# Patient Record
Sex: MALE | Race: BLACK OR AFRICAN AMERICAN | NOT HISPANIC OR LATINO | Employment: FULL TIME | URBAN - METROPOLITAN AREA
[De-identification: names, ages, dates, MRNs, and addresses within clinical notes are randomized per-mention and may not be internally consistent; named-entity substitution may affect disease eponyms.]

---

## 2021-08-04 ENCOUNTER — OFFICE VISIT (OUTPATIENT)
Dept: UROLOGY | Facility: CLINIC | Age: 46
End: 2021-08-04
Payer: COMMERCIAL

## 2021-08-04 DIAGNOSIS — N52.01 ERECTILE DYSFUNCTION DUE TO ARTERIAL INSUFFICIENCY: Primary | ICD-10-CM

## 2021-08-04 PROCEDURE — 99203 OFFICE O/P NEW LOW 30 MIN: CPT | Performed by: UROLOGY

## 2021-08-04 RX ORDER — MONTELUKAST SODIUM 10 MG/1
10 TABLET ORAL
COMMUNITY
Start: 2021-06-21

## 2021-08-04 RX ORDER — TAMSULOSIN HYDROCHLORIDE 0.4 MG/1
0.4 CAPSULE ORAL
COMMUNITY
Start: 2020-10-12

## 2021-08-04 RX ORDER — FLUTICASONE PROPIONATE 50 MCG
1 SPRAY, SUSPENSION (ML) NASAL
COMMUNITY
Start: 2020-04-10

## 2021-08-04 RX ORDER — ATORVASTATIN CALCIUM 20 MG/1
20 TABLET, FILM COATED ORAL
COMMUNITY
Start: 2021-07-28

## 2021-08-04 RX ORDER — FINASTERIDE 5 MG/1
5 TABLET, FILM COATED ORAL
COMMUNITY
Start: 2020-10-12

## 2021-08-04 NOTE — PROGRESS NOTES
I am seeing Walter Rehman in consultation from Dr. Hurtado for IPP surgery.    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  This was shown on penile duplex ultrasound.  Patient has failed ICI, and other ED therapies.  He has consulted with Dr. 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  He has no history of urinary problems, he has not had a hernia repair previously.    REVIEW OF SYSTEMS:  General: negative  Skin: negative  Eyes: negative  Ears/Nose/Throat: negative  Respiratory: negative  Cardiovascular: negative  Gastrointestinal: negative  Genitourinary: see HPI  Musculoskeletal: negative  Neurologic: negative  Psychiatric: negative  Hematologic/Lymphatic/Immunologic: negative  Endocrine: negative    PAST MEDICAL HX:  HLD   Asthma    PAST SURG HX:  Cystoscopy stent placement.  Vasectomy     FAMILY HX:  No family history on file.    SOCIAL HX:  Social History     Tobacco Use     Smoking status: Never Smoker     Smokeless tobacco: Never Used   Substance Use Topics     Alcohol use: Not on file     Drug use: Not on file       MEDICATIONS:  Current Outpatient Medications   Medication Sig     atorvastatin (LIPITOR) 20 MG tablet Take 20 mg by mouth     finasteride (PROSCAR) 5 MG tablet Take 5 mg by mouth     fluticasone (FLONASE) 50 MCG/ACT nasal spray Spray 1 spray in nostril     guaiFENesin-codeine (ROBITUSSIN AC) 100-10 MG/5ML SOLN Take 5 mLs by mouth every 6 hours as needed for cough     montelukast (SINGULAIR) 10 MG tablet Take 10 mg by mouth     tamsulosin (FLOMAX) 0.4 MG capsule Take 0.4 mg by mouth     No current facility-administered medications for this visit.       ALLERGIES:  Ciprofloxacin      GENERAL PHYSICAL EXAM:     There were no vitals taken for this visit.   Constitutional: No acute distress. Well nourished.   PSYCH: normal mood and affect.  NEURO: normal  gait, no focal deficits.   EYES: anicteric, EOMI, PERR.  CARDIOPULMONARY: breathing non-labored, pulse regular rate/rhythm, no peripheral edema.  GI: Abdomen soft, non-tender, nondistended   MUSCULOSKELETAL: normal limb proportions, no muscle wasting, no contractures.  SKIN: Normal virilized hair distribution, no lesions, warts or rashes over genitalia, abdomen extremities or face.  HEME/LYMPH: no ecchymosis, no lymphadenopathy in groin, no lymphedema.     EXAM:  Phallus circumcised, meatus adequate, no plaques palpated.   Left testis descended , size is 15, consistency is normal. No intra-testicular masses.   Right testis descended , size is 15, consistency is normal . No intra-testicular masses.   Epididymes present, non-tender, not-enlarged.   Cord structures not remarkable.     Prostate exam: deferred     Imaging/labs:  Outside facility labs July 2020  Total testosterone 495  Prolactin 8.9  LH 8.6  FSH 2.8    ASSESSMENT:     ED secondary to arterial disease, congenital versus acquired.  He has failed other 1st and 2nd line options, and is requesting a penile implant.    PLAN:    I counseled the patient on the risks of penile implant surgery. These include, but are not limited to infection, scarring, penile shortening, damage to urethra and bladder, pain and erosion. I explained to him the risk of infection and the need for explantation in those cases; that other treatments for ED cannot be used after placing an implant, and that implants have a mechanical failure rate.  Discussed possible ectopic reservoir, possible glans necrosis/ tissue loss.  I answered all of his questions to the best of my ability and to the patient's satisfaction.  I showed him the model device, Coloplast.      Schedule IPP surgery, next available.       Jonah Slater MD     Urological Surgeon    Additional Coding Information:    Problems:  3 -- one stable chronic illness    Data Reviewed  Review of the result(s) of each unique test -  Labs as listed      Level of risk:  3 -- low risk (e.g., OTC medication or observation, minor surgery without risks)    Time spent:  30 minutes spent on the date of the encounter doing chart review, history and exam, documentation and further activities per the note

## 2021-08-27 ENCOUNTER — TELEPHONE (OUTPATIENT)
Dept: UROLOGY | Facility: CLINIC | Age: 46
End: 2021-08-27

## 2021-08-27 PROBLEM — N52.01 ERECTILE DYSFUNCTION DUE TO ARTERIAL INSUFFICIENCY: Status: ACTIVE | Noted: 2021-08-27

## 2021-08-30 NOTE — TELEPHONE ENCOUNTER
Patient is scheduled for surgery with Dr. Slater    Spoke with: Patient     Date of Surgery: Tuesday 10/05/21    Location: [facility] OR     Informed patient they will need an adult  Yes    Pre op with Provider na    H&P: Scheduled with Patient will call and schedule pre-op with his PCP.    Pre-procedure COVID-19 Test: patient will have done at [facility].     Additional imaging/appointments: 2 week post op in person visit with Dr. Slater Friday 10/22/21     Surgery packet: mailed to patient 08/30/21, verified address on file is current and correct.      Additional comments: na

## 2021-09-05 DIAGNOSIS — Z11.59 ENCOUNTER FOR SCREENING FOR OTHER VIRAL DISEASES: ICD-10-CM

## 2021-09-28 ENCOUNTER — PATIENT OUTREACH (OUTPATIENT)
Dept: UROLOGY | Facility: CLINIC | Age: 46
End: 2021-09-28

## 2021-09-28 ENCOUNTER — TELEPHONE (OUTPATIENT)
Dept: UROLOGY | Facility: CLINIC | Age: 46
End: 2021-09-28

## 2021-09-28 DIAGNOSIS — N39.0 URINARY TRACT INFECTION: Primary | ICD-10-CM

## 2021-09-28 NOTE — TELEPHONE ENCOUNTER
[facility] Call Center    Phone Message    May a detailed message be left on voicemail: yes     Reason for Call: Other: Patient returned call.  He has his pre-op, covid test and lab all scheudled for this Friday.       Action Taken: Message routed to:  [facility] ([facility] Uro    Travel Screening: Not Applicable

## 2021-09-28 NOTE — TELEPHONE ENCOUNTER
Left message to see if he has pre-op physical scheduled for his procedure with Dr Slater 10/5/21. He also needs to have urine culture and Covid testing     Ina Saleh, RN   Care Coordinator Urology

## 2021-09-30 ENCOUNTER — PATIENT OUTREACH (OUTPATIENT)
Dept: UROLOGY | Facility: CLINIC | Age: 46
End: 2021-09-30

## 2021-09-30 RX ORDER — CARVEDILOL 3.12 MG/1
3.12 TABLET ORAL 2 TIMES DAILY WITH MEALS
COMMUNITY

## 2021-09-30 NOTE — TELEPHONE ENCOUNTER
Patient will have his pre-op done tomorrow with Dr Dung Vizcaino at the [facility]  He will have urine done at the Covid test visit

## 2021-10-01 ENCOUNTER — LAB (OUTPATIENT)
Dept: LAB | Facility: CLINIC | Age: 46
End: 2021-10-01
Attending: UROLOGY

## 2021-10-01 ENCOUNTER — LAB (OUTPATIENT)
Dept: LAB | Facility: CLINIC | Age: 46
End: 2021-10-01
Attending: UROLOGY
Payer: COMMERCIAL

## 2021-10-01 DIAGNOSIS — Z11.59 ENCOUNTER FOR SCREENING FOR OTHER VIRAL DISEASES: ICD-10-CM

## 2021-10-01 DIAGNOSIS — N39.0 URINARY TRACT INFECTION: ICD-10-CM

## 2021-10-01 PROCEDURE — U0005 INFEC AGEN DETEC AMPLI PROBE: HCPCS | Mod: 90 | Performed by: PATHOLOGY

## 2021-10-01 PROCEDURE — U0003 INFECTIOUS AGENT DETECTION BY NUCLEIC ACID (DNA OR RNA); SEVERE ACUTE RESPIRATORY SYNDROME CORONAVIRUS 2 (SARS-COV-2) (CORONAVIRUS DISEASE [COVID-19]), AMPLIFIED PROBE TECHNIQUE, MAKING USE OF HIGH THROUGHPUT TECHNOLOGIES AS DESCRIBED BY CMS-2020-01-R: HCPCS | Mod: 90 | Performed by: PATHOLOGY

## 2021-10-01 PROCEDURE — 87086 URINE CULTURE/COLONY COUNT: CPT | Mod: 90 | Performed by: PATHOLOGY

## 2021-10-02 LAB
BACTERIA UR CULT: NO GROWTH
SARS-COV-2 RNA RESP QL NAA+PROBE: NEGATIVE

## 2021-10-04 ENCOUNTER — ANESTHESIA EVENT (OUTPATIENT)
Dept: SURGERY | Facility: CLINIC | Age: 46
End: 2021-10-04
Payer: COMMERCIAL

## 2021-10-05 ENCOUNTER — ANESTHESIA (OUTPATIENT)
Dept: SURGERY | Facility: CLINIC | Age: 46
End: 2021-10-05
Payer: COMMERCIAL

## 2021-10-05 ENCOUNTER — CONFIDENTIAL (OUTPATIENT)
Dept: UROLOGY | Facility: CLINIC | Age: 46
End: 2021-10-05

## 2021-10-05 ENCOUNTER — HOSPITAL ENCOUNTER (OUTPATIENT)
Facility: CLINIC | Age: 46
LOS: 1 days | Discharge: HOME | End: 2021-10-07
Attending: UROLOGY | Admitting: UROLOGY
Payer: COMMERCIAL

## 2021-10-05 ENCOUNTER — PRE VISIT (OUTPATIENT)
Dept: UROLOGY | Facility: CLINIC | Age: 46
End: 2021-10-05

## 2021-10-05 DIAGNOSIS — N52.01 ERECTILE DYSFUNCTION DUE TO ARTERIAL INSUFFICIENCY: ICD-10-CM

## 2021-10-05 LAB — GLUCOSE BLDC GLUCOMTR-MCNC: 97 MG/DL (ref 70–99)

## 2021-10-05 PROCEDURE — 250N000013 HC RX MED GY IP 250 OP 250 PS 637: Performed by: STUDENT IN AN ORGANIZED HEALTH CARE EDUCATION/TRAINING PROGRAM

## 2021-10-05 PROCEDURE — 258N000003 HC RX IP 258 OP 636: Performed by: UROLOGY

## 2021-10-05 PROCEDURE — 370N000017 HC ANESTHESIA TECHNICAL FEE, PER MIN: Performed by: UROLOGY

## 2021-10-05 PROCEDURE — 258N000003 HC RX IP 258 OP 636

## 2021-10-05 PROCEDURE — 250N000011 HC RX IP 250 OP 636: Performed by: NURSE ANESTHETIST, CERTIFIED REGISTERED

## 2021-10-05 PROCEDURE — 250N000011 HC RX IP 250 OP 636: Performed by: STUDENT IN AN ORGANIZED HEALTH CARE EDUCATION/TRAINING PROGRAM

## 2021-10-05 PROCEDURE — 272N000001 HC OR GENERAL SUPPLY STERILE: Performed by: UROLOGY

## 2021-10-05 PROCEDURE — C1713 ANCHOR/SCREW BN/BN,TIS/BN: HCPCS | Performed by: UROLOGY

## 2021-10-05 PROCEDURE — 278N000051 HC OR IMPLANT GENERAL: Performed by: UROLOGY

## 2021-10-05 PROCEDURE — 250N000013 HC RX MED GY IP 250 OP 250 PS 637: Performed by: ANESTHESIOLOGY

## 2021-10-05 PROCEDURE — 250N000011 HC RX IP 250 OP 636: Performed by: UROLOGY

## 2021-10-05 PROCEDURE — 360N000076 HC SURGERY LEVEL 3, PER MIN: Performed by: UROLOGY

## 2021-10-05 PROCEDURE — 710N000010 HC RECOVERY PHASE 1, LEVEL 2, PER MIN: Performed by: UROLOGY

## 2021-10-05 PROCEDURE — 250N000025 HC SEVOFLURANE, PER MIN: Performed by: UROLOGY

## 2021-10-05 PROCEDURE — 250N000009 HC RX 250: Performed by: NURSE ANESTHETIST, CERTIFIED REGISTERED

## 2021-10-05 PROCEDURE — 999N000141 HC STATISTIC PRE-PROCEDURE NURSING ASSESSMENT: Performed by: UROLOGY

## 2021-10-05 PROCEDURE — 258N000003 HC RX IP 258 OP 636: Performed by: STUDENT IN AN ORGANIZED HEALTH CARE EDUCATION/TRAINING PROGRAM

## 2021-10-05 PROCEDURE — 250N000009 HC RX 250: Performed by: UROLOGY

## 2021-10-05 PROCEDURE — 54405 INSERT MULTI-COMP PENIS PROS: CPT | Mod: GC | Performed by: STUDENT IN AN ORGANIZED HEALTH CARE EDUCATION/TRAINING PROGRAM

## 2021-10-05 PROCEDURE — 250N000009 HC RX 250: Performed by: STUDENT IN AN ORGANIZED HEALTH CARE EDUCATION/TRAINING PROGRAM

## 2021-10-05 PROCEDURE — C1813 PROSTHESIS, PENILE, INFLATAB: HCPCS | Performed by: UROLOGY

## 2021-10-05 DEVICE — IMPLANTABLE DEVICE: Type: IMPLANTABLE DEVICE | Site: ABDOMEN | Status: FUNCTIONAL

## 2021-10-05 DEVICE — SCROTAL ZERO DEGREE ANGLE CYLINDER SET WITH PUMP
Type: IMPLANTABLE DEVICE | Site: PENIS | Status: FUNCTIONAL
Brand: TITAN

## 2021-10-05 DEVICE — IMP PROSTHESIS PENILE TITAN STD ASSEMBLY KIT 91-9480SC: Type: IMPLANTABLE DEVICE | Site: PENIS | Status: FUNCTIONAL

## 2021-10-05 RX ORDER — FENTANYL CITRATE 50 UG/ML
0.5 INJECTION, SOLUTION INTRAMUSCULAR; INTRAVENOUS EVERY 10 MIN PRN
Status: CANCELLED | OUTPATIENT
Start: 2021-10-05

## 2021-10-05 RX ORDER — DOCUSATE SODIUM 100 MG/1
100 CAPSULE, LIQUID FILLED ORAL 2 TIMES DAILY
Status: DISCONTINUED | OUTPATIENT
Start: 2021-10-05 | End: 2021-10-07 | Stop reason: HOSPADM

## 2021-10-05 RX ORDER — ACETAMINOPHEN 325 MG/1
975 TABLET ORAL ONCE
Status: COMPLETED | OUTPATIENT
Start: 2021-10-05 | End: 2021-10-05

## 2021-10-05 RX ORDER — LIDOCAINE 40 MG/G
CREAM TOPICAL
Status: DISCONTINUED | OUTPATIENT
Start: 2021-10-05 | End: 2021-10-05 | Stop reason: HOSPADM

## 2021-10-05 RX ORDER — PROPOFOL 10 MG/ML
INJECTION, EMULSION INTRAVENOUS CONTINUOUS PRN
Status: DISCONTINUED | OUTPATIENT
Start: 2021-10-05 | End: 2021-10-05

## 2021-10-05 RX ORDER — CEFAZOLIN SODIUM 2 G/100ML
2 INJECTION, SOLUTION INTRAVENOUS SEE ADMIN INSTRUCTIONS
Status: DISCONTINUED | OUTPATIENT
Start: 2021-10-05 | End: 2021-10-05 | Stop reason: HOSPADM

## 2021-10-05 RX ORDER — METOPROLOL TARTRATE 1 MG/ML
1-2 INJECTION, SOLUTION INTRAVENOUS EVERY 5 MIN PRN
Status: DISCONTINUED | OUTPATIENT
Start: 2021-10-05 | End: 2021-10-05 | Stop reason: HOSPADM

## 2021-10-05 RX ORDER — EPHEDRINE SULFATE 50 MG/ML
INJECTION, SOLUTION INTRAMUSCULAR; INTRAVENOUS; SUBCUTANEOUS PRN
Status: DISCONTINUED | OUTPATIENT
Start: 2021-10-05 | End: 2021-10-05

## 2021-10-05 RX ORDER — ONDANSETRON 2 MG/ML
INJECTION INTRAMUSCULAR; INTRAVENOUS PRN
Status: DISCONTINUED | OUTPATIENT
Start: 2021-10-05 | End: 2021-10-05

## 2021-10-05 RX ORDER — PROPOFOL 10 MG/ML
INJECTION, EMULSION INTRAVENOUS PRN
Status: DISCONTINUED | OUTPATIENT
Start: 2021-10-05 | End: 2021-10-05

## 2021-10-05 RX ORDER — SODIUM CHLORIDE, SODIUM LACTATE, POTASSIUM CHLORIDE, CALCIUM CHLORIDE 600; 310; 30; 20 MG/100ML; MG/100ML; MG/100ML; MG/100ML
INJECTION, SOLUTION INTRAVENOUS CONTINUOUS
Status: DISCONTINUED | OUTPATIENT
Start: 2021-10-05 | End: 2021-10-05 | Stop reason: HOSPADM

## 2021-10-05 RX ORDER — ONDANSETRON 4 MG/1
4 TABLET, ORALLY DISINTEGRATING ORAL EVERY 30 MIN PRN
Status: DISCONTINUED | OUTPATIENT
Start: 2021-10-05 | End: 2021-10-05 | Stop reason: HOSPADM

## 2021-10-05 RX ORDER — BACITRACIN ZINC 500 [USP'U]/G
OINTMENT TOPICAL PRN
Status: DISCONTINUED | OUTPATIENT
Start: 2021-10-05 | End: 2021-10-05 | Stop reason: HOSPADM

## 2021-10-05 RX ORDER — ACETAMINOPHEN 325 MG/1
650 TABLET ORAL EVERY 6 HOURS
Status: DISCONTINUED | OUTPATIENT
Start: 2021-10-05 | End: 2021-10-07 | Stop reason: HOSPADM

## 2021-10-05 RX ORDER — BUPIVACAINE HYDROCHLORIDE 5 MG/ML
INJECTION, SOLUTION PERINEURAL PRN
Status: DISCONTINUED | OUTPATIENT
Start: 2021-10-05 | End: 2021-10-05 | Stop reason: HOSPADM

## 2021-10-05 RX ORDER — FENTANYL CITRATE 50 UG/ML
25 INJECTION, SOLUTION INTRAMUSCULAR; INTRAVENOUS EVERY 5 MIN PRN
Status: DISCONTINUED | OUTPATIENT
Start: 2021-10-05 | End: 2021-10-05 | Stop reason: HOSPADM

## 2021-10-05 RX ORDER — ONDANSETRON 2 MG/ML
4 INJECTION INTRAMUSCULAR; INTRAVENOUS EVERY 30 MIN PRN
Status: DISCONTINUED | OUTPATIENT
Start: 2021-10-05 | End: 2021-10-05 | Stop reason: HOSPADM

## 2021-10-05 RX ORDER — NALOXONE HYDROCHLORIDE 0.4 MG/ML
0.4 INJECTION, SOLUTION INTRAMUSCULAR; INTRAVENOUS; SUBCUTANEOUS
Status: DISCONTINUED | OUTPATIENT
Start: 2021-10-05 | End: 2021-10-07 | Stop reason: HOSPADM

## 2021-10-05 RX ORDER — SODIUM CHLORIDE 9 MG/ML
INJECTION, SOLUTION INTRAVENOUS CONTINUOUS
Status: DISCONTINUED | OUTPATIENT
Start: 2021-10-05 | End: 2021-10-06

## 2021-10-05 RX ORDER — CEFAZOLIN SODIUM 1 G/3ML
1 INJECTION, POWDER, FOR SOLUTION INTRAMUSCULAR; INTRAVENOUS EVERY 8 HOURS
Status: DISCONTINUED | OUTPATIENT
Start: 2021-10-05 | End: 2021-10-07 | Stop reason: HOSPADM

## 2021-10-05 RX ORDER — FINASTERIDE 5 MG/1
5 TABLET, FILM COATED ORAL DAILY
Status: DISCONTINUED | OUTPATIENT
Start: 2021-10-05 | End: 2021-10-07 | Stop reason: HOSPADM

## 2021-10-05 RX ORDER — OXYCODONE HYDROCHLORIDE 10 MG/1
10 TABLET ORAL EVERY 4 HOURS PRN
Status: DISCONTINUED | OUTPATIENT
Start: 2021-10-05 | End: 2021-10-07 | Stop reason: HOSPADM

## 2021-10-05 RX ORDER — NALOXONE HYDROCHLORIDE 0.4 MG/ML
0.2 INJECTION, SOLUTION INTRAMUSCULAR; INTRAVENOUS; SUBCUTANEOUS
Status: DISCONTINUED | OUTPATIENT
Start: 2021-10-05 | End: 2021-10-07 | Stop reason: HOSPADM

## 2021-10-05 RX ORDER — CARVEDILOL 3.12 MG/1
3.12 TABLET ORAL 2 TIMES DAILY WITH MEALS
Status: DISCONTINUED | OUTPATIENT
Start: 2021-10-05 | End: 2021-10-07 | Stop reason: HOSPADM

## 2021-10-05 RX ORDER — OXYCODONE HYDROCHLORIDE 5 MG/1
5 TABLET ORAL EVERY 4 HOURS PRN
Status: DISCONTINUED | OUTPATIENT
Start: 2021-10-05 | End: 2021-10-05 | Stop reason: HOSPADM

## 2021-10-05 RX ORDER — OXYCODONE HYDROCHLORIDE 5 MG/1
5 TABLET ORAL EVERY 6 HOURS PRN
Qty: 20 TABLET | Refills: 0 | Status: SHIPPED | OUTPATIENT
Start: 2021-10-05 | End: 2021-10-10

## 2021-10-05 RX ORDER — DEXAMETHASONE SODIUM PHOSPHATE 4 MG/ML
8 INJECTION, SOLUTION INTRA-ARTICULAR; INTRALESIONAL; INTRAMUSCULAR; INTRAVENOUS; SOFT TISSUE
Status: CANCELLED | OUTPATIENT
Start: 2021-10-05

## 2021-10-05 RX ORDER — TAMSULOSIN HYDROCHLORIDE 0.4 MG/1
0.4 CAPSULE ORAL DAILY
Status: DISCONTINUED | OUTPATIENT
Start: 2021-10-05 | End: 2021-10-07 | Stop reason: HOSPADM

## 2021-10-05 RX ORDER — HYDROMORPHONE HCL IN WATER/PF 6 MG/30 ML
0.2 PATIENT CONTROLLED ANALGESIA SYRINGE INTRAVENOUS EVERY 5 MIN PRN
Status: DISCONTINUED | OUTPATIENT
Start: 2021-10-05 | End: 2021-10-05 | Stop reason: HOSPADM

## 2021-10-05 RX ORDER — CEFAZOLIN SODIUM 2 G/100ML
2 INJECTION, SOLUTION INTRAVENOUS
Status: COMPLETED | OUTPATIENT
Start: 2021-10-05 | End: 2021-10-05

## 2021-10-05 RX ORDER — HYDROMORPHONE HCL IN WATER/PF 6 MG/30 ML
0.2 PATIENT CONTROLLED ANALGESIA SYRINGE INTRAVENOUS
Status: DISCONTINUED | OUTPATIENT
Start: 2021-10-05 | End: 2021-10-07 | Stop reason: HOSPADM

## 2021-10-05 RX ORDER — SODIUM CHLORIDE, SODIUM LACTATE, POTASSIUM CHLORIDE, CALCIUM CHLORIDE 600; 310; 30; 20 MG/100ML; MG/100ML; MG/100ML; MG/100ML
INJECTION, SOLUTION INTRAVENOUS CONTINUOUS PRN
Status: DISCONTINUED | OUTPATIENT
Start: 2021-10-05 | End: 2021-10-05

## 2021-10-05 RX ORDER — LIDOCAINE 40 MG/G
CREAM TOPICAL
Status: DISCONTINUED | OUTPATIENT
Start: 2021-10-05 | End: 2021-10-07 | Stop reason: HOSPADM

## 2021-10-05 RX ORDER — ALBUTEROL SULFATE 0.83 MG/ML
2.5 SOLUTION RESPIRATORY (INHALATION)
Status: DISCONTINUED | OUTPATIENT
Start: 2021-10-05 | End: 2021-10-05 | Stop reason: HOSPADM

## 2021-10-05 RX ORDER — OXYCODONE HYDROCHLORIDE 5 MG/1
5 TABLET ORAL EVERY 4 HOURS PRN
Status: DISCONTINUED | OUTPATIENT
Start: 2021-10-05 | End: 2021-10-07 | Stop reason: HOSPADM

## 2021-10-05 RX ORDER — LIDOCAINE HYDROCHLORIDE 20 MG/ML
INJECTION, SOLUTION INFILTRATION; PERINEURAL PRN
Status: DISCONTINUED | OUTPATIENT
Start: 2021-10-05 | End: 2021-10-05

## 2021-10-05 RX ORDER — ATORVASTATIN CALCIUM 20 MG/1
20 TABLET, FILM COATED ORAL DAILY
Status: DISCONTINUED | OUTPATIENT
Start: 2021-10-05 | End: 2021-10-07 | Stop reason: HOSPADM

## 2021-10-05 RX ORDER — FLUTICASONE PROPIONATE 50 MCG
1 SPRAY, SUSPENSION (ML) NASAL DAILY
Status: DISCONTINUED | OUTPATIENT
Start: 2021-10-05 | End: 2021-10-07 | Stop reason: HOSPADM

## 2021-10-05 RX ORDER — SODIUM CHLORIDE 9 MG/ML
INJECTION, SOLUTION INTRAVENOUS
Status: COMPLETED
Start: 2021-10-05 | End: 2021-10-05

## 2021-10-05 RX ORDER — ONDANSETRON 4 MG/1
4 TABLET, ORALLY DISINTEGRATING ORAL EVERY 6 HOURS PRN
Status: DISCONTINUED | OUTPATIENT
Start: 2021-10-05 | End: 2021-10-07 | Stop reason: HOSPADM

## 2021-10-05 RX ORDER — MONTELUKAST SODIUM 10 MG/1
10 TABLET ORAL AT BEDTIME
Status: DISCONTINUED | OUTPATIENT
Start: 2021-10-05 | End: 2021-10-07 | Stop reason: HOSPADM

## 2021-10-05 RX ORDER — DEXAMETHASONE SODIUM PHOSPHATE 4 MG/ML
INJECTION, SOLUTION INTRA-ARTICULAR; INTRALESIONAL; INTRAMUSCULAR; INTRAVENOUS; SOFT TISSUE PRN
Status: DISCONTINUED | OUTPATIENT
Start: 2021-10-05 | End: 2021-10-05

## 2021-10-05 RX ORDER — OXYCODONE HCL 5 MG/5 ML
0.1 SOLUTION, ORAL ORAL EVERY 4 HOURS PRN
Status: CANCELLED | OUTPATIENT
Start: 2021-10-05

## 2021-10-05 RX ORDER — ONDANSETRON 2 MG/ML
4 INJECTION INTRAMUSCULAR; INTRAVENOUS EVERY 6 HOURS PRN
Status: DISCONTINUED | OUTPATIENT
Start: 2021-10-05 | End: 2021-10-07 | Stop reason: HOSPADM

## 2021-10-05 RX ORDER — HYDROMORPHONE HCL IN WATER/PF 6 MG/30 ML
0.4 PATIENT CONTROLLED ANALGESIA SYRINGE INTRAVENOUS
Status: DISCONTINUED | OUTPATIENT
Start: 2021-10-05 | End: 2021-10-07 | Stop reason: HOSPADM

## 2021-10-05 RX ORDER — FENTANYL CITRATE 50 UG/ML
INJECTION, SOLUTION INTRAMUSCULAR; INTRAVENOUS PRN
Status: DISCONTINUED | OUTPATIENT
Start: 2021-10-05 | End: 2021-10-05

## 2021-10-05 RX ADMIN — GENTAMICIN SULFATE 240 MG: 40 INJECTION, SOLUTION INTRAMUSCULAR; INTRAVENOUS at 14:18

## 2021-10-05 RX ADMIN — Medication 5 MG: at 15:21

## 2021-10-05 RX ADMIN — Medication 2 G: at 14:18

## 2021-10-05 RX ADMIN — DOCUSATE SODIUM 100 MG: 100 CAPSULE, LIQUID FILLED ORAL at 20:00

## 2021-10-05 RX ADMIN — FENTANYL CITRATE 25 MCG: 50 INJECTION, SOLUTION INTRAMUSCULAR; INTRAVENOUS at 14:24

## 2021-10-05 RX ADMIN — ACETAMINOPHEN 975 MG: 325 TABLET, FILM COATED ORAL at 13:41

## 2021-10-05 RX ADMIN — MONTELUKAST 10 MG: 10 TABLET, FILM COATED ORAL at 22:08

## 2021-10-05 RX ADMIN — PROPOFOL 25 MCG/KG/MIN: 10 INJECTION, EMULSION INTRAVENOUS at 14:33

## 2021-10-05 RX ADMIN — FENTANYL CITRATE 25 MCG: 50 INJECTION, SOLUTION INTRAMUSCULAR; INTRAVENOUS at 16:39

## 2021-10-05 RX ADMIN — PROPOFOL 250 MG: 10 INJECTION, EMULSION INTRAVENOUS at 14:24

## 2021-10-05 RX ADMIN — FENTANYL CITRATE 25 MCG: 50 INJECTION, SOLUTION INTRAMUSCULAR; INTRAVENOUS at 16:33

## 2021-10-05 RX ADMIN — CARVEDILOL 3.12 MG: 3.12 TABLET, FILM COATED ORAL at 19:06

## 2021-10-05 RX ADMIN — FENTANYL CITRATE 50 MCG: 50 INJECTION, SOLUTION INTRAMUSCULAR; INTRAVENOUS at 14:54

## 2021-10-05 RX ADMIN — FENTANYL CITRATE 25 MCG: 50 INJECTION, SOLUTION INTRAMUSCULAR; INTRAVENOUS at 16:58

## 2021-10-05 RX ADMIN — PROPOFOL 50 MG: 10 INJECTION, EMULSION INTRAVENOUS at 15:14

## 2021-10-05 RX ADMIN — SODIUM CHLORIDE: 9 INJECTION, SOLUTION INTRAVENOUS at 19:23

## 2021-10-05 RX ADMIN — ACETAMINOPHEN 975 MG: 325 TABLET, FILM COATED ORAL at 14:11

## 2021-10-05 RX ADMIN — ONDANSETRON 4 MG: 2 INJECTION INTRAMUSCULAR; INTRAVENOUS at 15:45

## 2021-10-05 RX ADMIN — DEXAMETHASONE SODIUM PHOSPHATE 6 MG: 4 INJECTION, SOLUTION INTRAMUSCULAR; INTRAVENOUS at 14:18

## 2021-10-05 RX ADMIN — Medication 5 MG: at 15:20

## 2021-10-05 RX ADMIN — LIDOCAINE HYDROCHLORIDE 90 MG: 20 INJECTION, SOLUTION INFILTRATION; PERINEURAL at 14:24

## 2021-10-05 RX ADMIN — SODIUM CHLORIDE, POTASSIUM CHLORIDE, SODIUM LACTATE AND CALCIUM CHLORIDE: 600; 310; 30; 20 INJECTION, SOLUTION INTRAVENOUS at 14:18

## 2021-10-05 RX ADMIN — Medication 7.5 MG: at 15:43

## 2021-10-05 RX ADMIN — HYDROMORPHONE HYDROCHLORIDE 0.2 MG: 0.2 INJECTION, SOLUTION INTRAMUSCULAR; INTRAVENOUS; SUBCUTANEOUS at 17:34

## 2021-10-05 RX ADMIN — HYDROMORPHONE HYDROCHLORIDE 0.2 MG: 1 INJECTION, SOLUTION INTRAMUSCULAR; INTRAVENOUS; SUBCUTANEOUS at 15:29

## 2021-10-05 RX ADMIN — HYDROMORPHONE HYDROCHLORIDE 0.3 MG: 1 INJECTION, SOLUTION INTRAMUSCULAR; INTRAVENOUS; SUBCUTANEOUS at 15:13

## 2021-10-05 RX ADMIN — FENTANYL CITRATE 25 MCG: 50 INJECTION, SOLUTION INTRAMUSCULAR; INTRAVENOUS at 16:51

## 2021-10-05 RX ADMIN — FENTANYL CITRATE 25 MCG: 50 INJECTION, SOLUTION INTRAMUSCULAR; INTRAVENOUS at 17:13

## 2021-10-05 RX ADMIN — OXYCODONE HYDROCHLORIDE 10 MG: 10 TABLET ORAL at 22:21

## 2021-10-05 RX ADMIN — FINASTERIDE 5 MG: 5 TABLET, FILM COATED ORAL at 19:06

## 2021-10-05 RX ADMIN — FENTANYL CITRATE 25 MCG: 50 INJECTION, SOLUTION INTRAMUSCULAR; INTRAVENOUS at 17:20

## 2021-10-05 RX ADMIN — ATORVASTATIN CALCIUM 20 MG: 20 TABLET, FILM COATED ORAL at 19:11

## 2021-10-05 RX ADMIN — HYDROMORPHONE HYDROCHLORIDE 0.3 MG: 1 INJECTION, SOLUTION INTRAMUSCULAR; INTRAVENOUS; SUBCUTANEOUS at 15:07

## 2021-10-05 RX ADMIN — ACETAMINOPHEN 650 MG: 325 TABLET, FILM COATED ORAL at 20:00

## 2021-10-05 RX ADMIN — OXYCODONE HYDROCHLORIDE 5 MG: 5 TABLET ORAL at 17:14

## 2021-10-05 RX ADMIN — HYDROMORPHONE HYDROCHLORIDE 0.2 MG: 1 INJECTION, SOLUTION INTRAMUSCULAR; INTRAVENOUS; SUBCUTANEOUS at 15:12

## 2021-10-05 RX ADMIN — CEFAZOLIN 1 G: 1 INJECTION, POWDER, FOR SOLUTION INTRAMUSCULAR; INTRAVENOUS at 22:09

## 2021-10-05 RX ADMIN — FENTANYL CITRATE 25 MCG: 50 INJECTION, SOLUTION INTRAMUSCULAR; INTRAVENOUS at 17:07

## 2021-10-05 RX ADMIN — HYDROMORPHONE HYDROCHLORIDE 0.4 MG: 0.2 INJECTION, SOLUTION INTRAMUSCULAR; INTRAVENOUS; SUBCUTANEOUS at 20:01

## 2021-10-05 RX ADMIN — MIDAZOLAM 2 MG: 1 INJECTION INTRAMUSCULAR; INTRAVENOUS at 14:24

## 2021-10-05 RX ADMIN — HYDROMORPHONE HYDROCHLORIDE 0.2 MG: 1 INJECTION, SOLUTION INTRAMUSCULAR; INTRAVENOUS; SUBCUTANEOUS at 15:37

## 2021-10-05 RX ADMIN — FENTANYL CITRATE 25 MCG: 50 INJECTION, SOLUTION INTRAMUSCULAR; INTRAVENOUS at 17:28

## 2021-10-05 RX ADMIN — TAMSULOSIN HYDROCHLORIDE 0.4 MG: 0.4 CAPSULE ORAL at 19:12

## 2021-10-05 RX ADMIN — FENTANYL CITRATE 25 MCG: 50 INJECTION, SOLUTION INTRAMUSCULAR; INTRAVENOUS at 14:50

## 2021-10-05 ASSESSMENT — MIFFLIN-ST. JEOR: SCORE: 1684

## 2021-10-05 ASSESSMENT — LIFESTYLE VARIABLES: TOBACCO_USE: 1

## 2021-10-05 NOTE — ANESTHESIA PREPROCEDURE EVALUATION
Anesthesia Pre-Procedure Evaluation    Patient: Walter Rehman   MRN: [id number] : 1975        Preoperative Diagnosis: Erectile dysfunction due to arterial insufficiency [N52.01]    Procedure : Procedure(s):  INSERTION, PENILE PROSTHESIS, INFLATABLE          Past Medical History:   Diagnosis Date     Depressive disorder      Erectile dysfunction      Uncomplicated asthma       History reviewed. No pertinent surgical history.   Allergies   Allergen Reactions     Ciprofloxacin Other (See Comments)     Severe vertigo; question after the fact if this is accurate as vertigo is a long standing problem that comes and goes and likely coincidential  Severe vertigo; question after the fact if this is accurate as vertigo is a long standing problem that comes and goes and likely coincidential        Social History     Tobacco Use     Smoking status: Former Smoker     Smokeless tobacco: Never Used   Substance Use Topics     Alcohol use: Yes     Alcohol/week: 0.0 standard drinks      Wt Readings from Last 1 Encounters:   16 82.1 kg (181 lb)        Anesthesia Evaluation   Pt has had prior anesthetic. Type: General.        ROS/MED HX  ENT/Pulmonary: Comment: Smoked cigars 10 years ago - neg pulmonary ROS   (+) tobacco use, Past use, Intermittent, asthma     Neurologic:  - neg neurologic ROS     Cardiovascular: Comment: CT scan of chest.  No coronary calcifications noted. Mildly reduced ejection fraction (mild cardiomyopathy).    (+) Dyslipidemia hypertension-----Previous cardiac testing   Echo: Date: 2021 Results:  LVEF 45-50%  Stress Test: Date: Results:    ECG Reviewed: Date: Results:    Cath: Date: Results:      METS/Exercise Tolerance:     Hematologic:  - neg hematologic  ROS     Musculoskeletal:  - neg musculoskeletal ROS     GI/Hepatic:  - neg GI/hepatic ROS     Renal/Genitourinary:  - neg Renal ROS     Endo:  - neg endo ROS     Psychiatric/Substance Use:       Infectious Disease:  - neg infectious  disease ROS     Malignancy:  - neg malignancy ROS     Other:            Physical Exam    Airway  airway exam normal           Respiratory Devices and Support         Dental  no notable dental history         Cardiovascular   cardiovascular exam normal          Pulmonary   pulmonary exam normal                OUTSIDE LABS:  CBC:   Lab Results   Component Value Date    WBC 5.6 04/18/2016    HGB 16.2 04/18/2016    HCT 46.6 04/18/2016     04/18/2016     BMP: No results found for: NA, POTASSIUM, CHLORIDE, CO2, BUN, CR, GLC  COAGS: No results found for: PTT, INR, FIBR  POC: No results found for: BGM, HCG, HCGS  HEPATIC: No results found for: ALBUMIN, PROTTOTAL, ALT, AST, GGT, ALKPHOS, BILITOTAL, BILIDIRECT, DAYANARA  OTHER: No results found for: PH, LACT, A1C, PARTHA, PHOS, MAG, LIPASE, AMYLASE, TSH, T4, T3, CRP, SED    Anesthesia Plan    ASA Status:  2   NPO Status:  NPO Appropriate    Anesthesia Type: General.     - Airway: LMA   Induction: Intravenous, Propofol.   Maintenance: Balanced.        Consents    Anesthesia Plan(s) and associated risks, benefits, and realistic alternatives discussed. Questions answered and patient/representative(s) expressed understanding.     - Discussed with:  Patient      - Extended Intubation/Ventilatory Support Discussed: No.      - Patient is DNR/DNI Status: No    Use of blood products discussed: No .     Postoperative Care    Pain management: IV analgesics, Oral pain medications.   PONV prophylaxis: Ondansetron (or other 5HT-3), Background Propofol Infusion, Dexamethasone or Solumedrol     Comments:                Shant Rosa MD

## 2021-10-05 NOTE — ANESTHESIA POSTPROCEDURE EVALUATION
Patient: Walter Rehman    Procedure: Procedure(s):  INSERTION, PENILE PROSTHESIS, INFLATABLE       Diagnosis:Erectile dysfunction due to arterial insufficiency [N52.01]  Diagnosis Additional Information: No value filed.    Anesthesia Type:  General    Note:  Disposition: Inpatient; Admission   Postop Pain Control: Uneventful            Sign Out: Well controlled pain   PONV: No   Neuro/Psych: Uneventful            Sign Out: Acceptable/Baseline neuro status   Airway/Respiratory: Uneventful            Sign Out: Acceptable/Baseline resp. status   CV/Hemodynamics: Uneventful            Sign Out: Acceptable CV status; No obvious hypovolemia; No obvious fluid overload   Other NRE: NONE   DID A NON-ROUTINE EVENT OCCUR? No    Event details/Postop Comments:  Tolerating PO. Reports pressure/burning around vasquez site. Pain managed. Denies questions re anesthesia.           Last vitals:  Vitals Value Taken Time   /76 10/05/21 1745   Temp 36.3  C (97.4  F) 10/05/21 1730   Pulse 76 10/05/21 1749   Resp 22 10/05/21 1750   SpO2 99 % 10/05/21 1734   Vitals shown include unvalidated device data.    Electronically Signed By: Kerri Rodriguez MD  October 5, 2021  6:00 PM

## 2021-10-05 NOTE — OR NURSING
PACU to Inpatient Nursing Handoff    Patient Walter Rehman is a 45 year old male who speaks English.   Procedure Procedure(s):  INSERTION, PENILE PROSTHESIS, INFLATABLE   Surgeon(s) Primary: Jonah Slater MD  Resident - Assisting: Stefania Sanderson MD     Allergies   Allergen Reactions     Ciprofloxacin Other (See Comments)     Severe vertigo; question after the fact if this is accurate as vertigo is a long standing problem that comes and goes and likely coincidential  Severe vertigo; question after the fact if this is accurate as vertigo is a long standing problem that comes and goes and likely coincidential         Isolation  No active isolations     Past Medical History   has a past medical history of Depressive disorder, Erectile dysfunction, and Uncomplicated asthma.    Anesthesia General   Dermatome Level     Preop Meds acetaminophen (Tylenol) - time given: 1411   Nerve block Not applicable   Intraop Meds dexamethasone (Decadron)  fentanyl (Sublimaze): 200 mcg total  hydromorphone (Dilaudid): 1.2 mg total  ondansetron (Zofran): last given at 4   Local Meds Yes   Antibiotics cefazolin (Ancef) - last given at 1418  gentamicin (Garamycin) - last given at 1418     Pain Patient Currently in Pain: yes   PACU meds  fentanyl (Sublimaze): 200 mcg (total dose) last given at 1728   hydromorphone (Dilaudid): 0.2 mg (total dose) last given at 1735    PCA / epidural No   Capnography     Telemetry ECG Rhythm: Normal sinus rhythm   Inpatient Telemetry Monitor Ordered? No        Labs Glucose Lab Results   Component Value Date    GLC 97 10/05/2021       Hgb Lab Results   Component Value Date    HGB 16.2 04/18/2016       INR No results found for: INR   PACU Imaging Not applicable     Wound/Incision Incision/Surgical Site 10/05/21 Bilateral Scrotum (Active)   Incision Assessment WDL except 10/05/21 1630   Closure Sutures 10/05/21 1545   Incision Drainage Amount Small 10/05/21 1630   Drainage Description UTV 10/05/21  1630   Number of days: 0      CMS        Equipment Capnography   Other LDA       IV Access Peripheral IV 10/05/21 Anterior;Left Wrist (Active)   Site Assessment WDL 10/05/21 1357   Line Status Saline locked 10/05/21 1357   Dressing Intervention New dressing  10/05/21 1357   Phlebitis Scale 0-->no symptoms 10/05/21 1357   Number of days: 0      Blood Products Not applicable EBL 10 mL   Intake/Output Date 10/05/21 0700 - 10/06/21 0659   Shift 4428-5597 2676-5875 0769-1749 24 Hour Total   INTAKE   I.V.  800  800   IV Piggyback 100   100   Shift Total(mL/kg) 100(1.13) 800(9.06)  900(10.19)   OUTPUT   Shift Total(mL/kg)       Weight (kg) 88.3 88.3 88.3 88.3      Drains / Mayberry Closed/Suction Drain 1 Midline;Left Other (Comment) Bulb 10 Tajik (Active)   Site Description UTV 10/05/21 1630   Dressing Status Normal: Clean, Dry & Intact 10/05/21 1630   Dressing Change Due 10/05/21 10/05/21 1630   Drainage Appearance Bloody/Bright Red 10/05/21 1630   Status To bulb suction 10/05/21 1630   Number of days: 0       Urethral Catheter Double-lumen 16 fr (Active)   Tube Description UT 10/05/21 1630   Catheter Care Done 10/05/21 1630   Collection Container Standard 10/05/21 1630   Securement Method Securing device (Describe) 10/05/21 1630   Rationale for Continued Use /GI/GYN Pelvic Procedure 10/05/21 1630   Number of days: 0      Time of void PreOp Void Prior to Procedure: 0600 (10/05/21 1327)    PostOp      Diapered? No   Bladder Scan     PO    water and popsicles     Vitals    B/P: (!) 147/89  T: 97.3  F (36.3  C)    Temp src: Axillary  P:  Pulse: 71 (10/05/21 1730)          R: 11  O2:  SpO2: 100 %    O2 Device: Nasal cannula (10/05/21 1715)    Oxygen Delivery: 3 LPM (10/05/21 1715)         Family/support present Pt wants to call family members himself   Patient belongings     Patient transported on cart   DC meds/scripts (obs/outpt) Not applicable   Inpatient Pain Meds Released? Yes       Special needs/considerations None    Tasks needing completion None       Kateryna Burgos, RN  [facility] [id number]

## 2021-10-05 NOTE — TELEPHONE ENCOUNTER
Reason for visit: Post op follow up     Relevant information: s/p IPP insertion    Records/imaging/labs/orders: in EPIC    Pt called: no    At Rooming: normal

## 2021-10-05 NOTE — OP NOTE
PREOPERATIVE DIAGNOSIS:  Erectile dysfunction.        POSTOPERATIVE DIAGNOSIS:  Erectile dysfunction.        PROCEDURE:  Placement of Coloplast 3-piece inflatable penile prosthesis.       Surgeon: Jonah Slater MD  Resident: Stefania Sanderson MD  EBL: 15 mL  Anesthesia: General      INDICATIONS:  Walter Rehman is a 45 year old male with erectile dysfunction refractory to conservative measures.   He understands risks to include but not be limited to bleeding, infection, penile pain or numbness, scrotal pain or numbness, penile curvature, device infection, device erosion, urethral injury and need for additional procedures.        DESCRIPTION OF PROCEDURE:  After informed consent was obtained and preoperative antibiotics were given, the patient was taken to the operating room, placed supine on the operating table.  General anesthesia was induced and an airway was secured.  He was placed in the frog-legged position.  The penis and scrotum and lower abdomen were shaved, prepped and draped in the usual sterile fashion.        A 16 Fr vasquez catheter was placed at the beginning of the case and the bladder was drained. A horizontal incision was made just below the penoscrotal junction/ Incision was carried down through the dartos fascia and the urethra was identified in the midline.  The tunica albuginea was cleared free of the overlying fascia on both sides of the corpora cavernosa. Next, we placed  2-0 PDS sutures on each side of the site of our planned corporotomies.  These were mattress sutures and the needles were cut off. A scalpel was then used to make a corporotomy on both sides.  Next, the corpora were dilated with Adhikari dilators proximally to the ischial tuborosity and distally to the mid glans up to #13 dilator. There was some distal corporal fibrosis bilaterally causing narrowing, and the Uramix cavernotomes were used serially to break up some of the fibrotic scar. Then, measurements were taken from both sides  using the Silviano measuring device.  We measured on the left side, at 10 cm proximal and 9.5 cm distal and on the right side 10 cm proximal and 9.5 cm distal.        While the nursing team was preparing the reservoir and the 18 cm prosthesis with 1.5 cm rear tip extenders; we proceeded with the other portions of the procedure.  First we dissected a space for the reservoir going through the right inguinal canal.  This was done using blunt finger dissection. Then, the reservoir was placed in the right space of Retzius and 63 mL of normal saline was placed in the reservoir. Of note, there was no back pressure at this volume.  The lockout valve was positioned away from the pubic bone.  The corpora were irrigated copiously with vanco/gent antibiotic solution.      The 18 cm prosthesis with bilateral 1.5 cm rear tip extenders was then placed into each corporal body in the standard fashion using the Silviano introducer and the Chet needle.  The device was tested by inflating it before closing the corporotomies and the penis appeared straight with no evidence of the device crossover.  The corporotomies were then closed with the device deflated.  Connections were made in the standard fashion.  The device was then inflated, showing a straight erection with device in good position.  The pump was placed in the right side of scrotum within a dartos pouch, and a purse-string suture of 3-0 Vicryl was used to hold this in place. An 10 Fr FLOR drain was inserted through the left scrotum and secured with a nylon drain stitch. 10 mL of local anesthetic was used to anesthetize the skin. The wound was irrigated with antibiotics again.  We ensured that the device was deflated.  The dartos fascia was closed in two layers with a running 2-0 Vicryl suture.  The skin was closed with 4-0 Monocryl suture in a running horizontal mattress fashion. The device was left 70% inflated.     Bacitracin was placed over the incision, followed by fluffs, a  mummy kerlex wrap, and scrotal support. The vasquez was connected to a drainage bag and secured. The patient was awakened from anesthesia, transferred to [facility] and then to the postanesthesia care unit in stable condition.  There were no complications.       DISPO:  - Admission overnight  - Device deflation, drain removal, and trial of void tomorrow  - Ancef while in the hospital, Bactrim for 7 days on discharge  - Clinic follow-up on 10/22    I was present and scrubbed for the entire procedure.  Jonah Slater MD  Urology Staff

## 2021-10-05 NOTE — ANESTHESIA PROCEDURE NOTES
Airway       Patient location during procedure: OR  Staff -        Anesthesiologist:  Jason Carmichael MD       Resident/Fellow: Shant Rosa MD       Performed By: resident  Consent for Airway        Urgency: elective  Indications and Patient Condition       Indications for airway management: kevin-procedural       Induction type:intravenous       Mask difficulty assessment: 1 - vent by mask    Final Airway Details       Final airway type: supraglottic airway    Supraglottic Airway Details        Type: LMA       Brand: Ambu AuraGain       LMA size: 5    Post intubation assessment        Placement verified by: capnometry, equal breath sounds and chest rise        Number of attempts at approach: 1       Number of other approaches attempted: 0       Secured with: pink tape       Ease of procedure: easy       Dentition: Unchanged and Intact

## 2021-10-05 NOTE — ANESTHESIA CARE TRANSFER NOTE
Patient: Walter Rehman    Procedure: Procedure(s):  INSERTION, PENILE PROSTHESIS, INFLATABLE       Diagnosis: Erectile dysfunction due to arterial insufficiency [N52.01]  Diagnosis Additional Information: No value filed.    Anesthesia Type:   General     Note:    Oropharynx: oropharynx clear of all foreign objects and spontaneously breathing  Level of Consciousness: drowsy  Oxygen Supplementation: face mask  Level of Supplemental Oxygen (L/min / FiO2): 5  Independent Airway: airway patency satisfactory and stable  Dentition: dentition unchanged  Vital Signs Stable: post-procedure vital signs reviewed and stable  Report to RN Given: handoff report given  Patient transferred to: PACU  Comments: 148/80, HR 76, sat 100%, RR 11  Handoff Report: Identifed the Patient, Identified the Reponsible Provider, Reviewed the pertinent medical history, Discussed the surgical course, Reviewed Intra-OP anesthesia mangement and issues during anesthesia, Set expectations for post-procedure period and Allowed opportunity for questions and acknowledgement of understanding      Vitals:  Vitals Value Taken Time   /99 10/05/21 1630   Temp     Pulse 76 10/05/21 1634   Resp 8 10/05/21 1634   SpO2 100 % 10/05/21 1634   Vitals shown include unvalidated device data.    Electronically Signed By: JOSHUA Rachel CRNA  October 5, 2021  4:36 PM

## 2021-10-06 LAB
ANION GAP SERPL CALCULATED.3IONS-SCNC: 2 MMOL/L (ref 3–14)
BUN SERPL-MCNC: 16 MG/DL (ref 7–30)
CALCIUM SERPL-MCNC: 8.2 MG/DL (ref 8.5–10.1)
CHLORIDE BLD-SCNC: 106 MMOL/L (ref 94–109)
CO2 SERPL-SCNC: 29 MMOL/L (ref 20–32)
CREAT SERPL-MCNC: 1.03 MG/DL (ref 0.66–1.25)
ERYTHROCYTE [DISTWIDTH] IN BLOOD BY AUTOMATED COUNT: 11.9 % (ref 10–15)
GFR SERPL CREATININE-BSD FRML MDRD: 88 ML/MIN/1.73M2
GLUCOSE BLD-MCNC: 112 MG/DL (ref 70–99)
HCT VFR BLD AUTO: 39.1 % (ref 40–53)
HGB BLD-MCNC: 13.4 G/DL (ref 13.3–17.7)
MCH RBC QN AUTO: 30.3 PG (ref 26.5–33)
MCHC RBC AUTO-ENTMCNC: 34.3 G/DL (ref 31.5–36.5)
MCV RBC AUTO: 89 FL (ref 78–100)
PLATELET # BLD AUTO: 219 10E3/UL (ref 150–450)
POTASSIUM BLD-SCNC: 3.8 MMOL/L (ref 3.4–5.3)
RBC # BLD AUTO: 4.42 10E6/UL (ref 4.4–5.9)
SODIUM SERPL-SCNC: 137 MMOL/L (ref 133–144)
WBC # BLD AUTO: 11.6 10E3/UL (ref 4–11)

## 2021-10-06 PROCEDURE — 250N000013 HC RX MED GY IP 250 OP 250 PS 637: Performed by: STUDENT IN AN ORGANIZED HEALTH CARE EDUCATION/TRAINING PROGRAM

## 2021-10-06 PROCEDURE — 36415 COLL VENOUS BLD VENIPUNCTURE: CPT | Performed by: STUDENT IN AN ORGANIZED HEALTH CARE EDUCATION/TRAINING PROGRAM

## 2021-10-06 PROCEDURE — 250N000011 HC RX IP 250 OP 636: Performed by: STUDENT IN AN ORGANIZED HEALTH CARE EDUCATION/TRAINING PROGRAM

## 2021-10-06 PROCEDURE — 80048 BASIC METABOLIC PNL TOTAL CA: CPT | Performed by: STUDENT IN AN ORGANIZED HEALTH CARE EDUCATION/TRAINING PROGRAM

## 2021-10-06 PROCEDURE — 85027 COMPLETE CBC AUTOMATED: CPT | Performed by: STUDENT IN AN ORGANIZED HEALTH CARE EDUCATION/TRAINING PROGRAM

## 2021-10-06 RX ADMIN — OXYCODONE HYDROCHLORIDE 10 MG: 10 TABLET ORAL at 09:50

## 2021-10-06 RX ADMIN — ATORVASTATIN CALCIUM 20 MG: 20 TABLET, FILM COATED ORAL at 08:34

## 2021-10-06 RX ADMIN — ACETAMINOPHEN 650 MG: 325 TABLET, FILM COATED ORAL at 02:07

## 2021-10-06 RX ADMIN — ACETAMINOPHEN 650 MG: 325 TABLET, FILM COATED ORAL at 08:34

## 2021-10-06 RX ADMIN — OXYCODONE HYDROCHLORIDE 10 MG: 10 TABLET ORAL at 18:13

## 2021-10-06 RX ADMIN — HYDROMORPHONE HYDROCHLORIDE 0.4 MG: 0.2 INJECTION, SOLUTION INTRAMUSCULAR; INTRAVENOUS; SUBCUTANEOUS at 06:07

## 2021-10-06 RX ADMIN — HYDROMORPHONE HYDROCHLORIDE 0.4 MG: 0.2 INJECTION, SOLUTION INTRAMUSCULAR; INTRAVENOUS; SUBCUTANEOUS at 03:24

## 2021-10-06 RX ADMIN — TAMSULOSIN HYDROCHLORIDE 0.4 MG: 0.4 CAPSULE ORAL at 08:34

## 2021-10-06 RX ADMIN — DOCUSATE SODIUM 100 MG: 100 CAPSULE, LIQUID FILLED ORAL at 20:35

## 2021-10-06 RX ADMIN — DOCUSATE SODIUM 100 MG: 100 CAPSULE, LIQUID FILLED ORAL at 08:34

## 2021-10-06 RX ADMIN — CEFAZOLIN 1 G: 1 INJECTION, POWDER, FOR SOLUTION INTRAMUSCULAR; INTRAVENOUS at 22:07

## 2021-10-06 RX ADMIN — FINASTERIDE 5 MG: 5 TABLET, FILM COATED ORAL at 08:34

## 2021-10-06 RX ADMIN — OXYCODONE HYDROCHLORIDE 10 MG: 10 TABLET ORAL at 06:39

## 2021-10-06 RX ADMIN — ACETAMINOPHEN 650 MG: 325 TABLET, FILM COATED ORAL at 20:35

## 2021-10-06 RX ADMIN — FLUTICASONE PROPIONATE 1 SPRAY: 50 SPRAY, METERED NASAL at 08:36

## 2021-10-06 RX ADMIN — OXYCODONE HYDROCHLORIDE 10 MG: 10 TABLET ORAL at 02:09

## 2021-10-06 RX ADMIN — OXYCODONE HYDROCHLORIDE 10 MG: 10 TABLET ORAL at 22:06

## 2021-10-06 RX ADMIN — MONTELUKAST 10 MG: 10 TABLET, FILM COATED ORAL at 22:06

## 2021-10-06 RX ADMIN — CARVEDILOL 3.12 MG: 3.12 TABLET, FILM COATED ORAL at 08:34

## 2021-10-06 RX ADMIN — OXYCODONE HYDROCHLORIDE 10 MG: 10 TABLET ORAL at 13:36

## 2021-10-06 RX ADMIN — CEFAZOLIN 1 G: 1 INJECTION, POWDER, FOR SOLUTION INTRAMUSCULAR; INTRAVENOUS at 13:36

## 2021-10-06 RX ADMIN — CEFAZOLIN 1 G: 1 INJECTION, POWDER, FOR SOLUTION INTRAMUSCULAR; INTRAVENOUS at 06:03

## 2021-10-06 RX ADMIN — ACETAMINOPHEN 650 MG: 325 TABLET, FILM COATED ORAL at 13:36

## 2021-10-06 RX ADMIN — CARVEDILOL 3.12 MG: 3.12 TABLET, FILM COATED ORAL at 18:10

## 2021-10-06 NOTE — PROGRESS NOTES
"Urology  Progress Note  10/06/2021    - Significant post-procedural pain overnight requiring multiple doses of IV dilaudid  - Eating and drinking    Exam  /66 (BP Location: Left arm)   Pulse 76   Temp 97.7  F (36.5  C) (Oral)   Resp 18   Ht 1.626 m (5' 4\")   Wt 88.3 kg (194 lb 10.7 oz)   SpO2 93%   BMI 33.41 kg/m    No acute distress  Unlabored breathing  Abdomen soft, nontender, nondistended.  Penis tender to palpation, glans appears well perfused. Scrotal incision c/d/i. Scrotum tender without hematoma.   FLOR sanguinous  Mayberry with clear/yellow urine in tubing    I/O's (last 24/since midnight):  /NR  FLOR 70/NR    Labs  AM labs pending.    Assessment/Plan  45 year old y/o male POD#1 s/p IPP placement. Having significant post-op pain.     Neuro: Tylenol, oxy, dilaudid for pain control  CV: HDS, PTA statin, coreg  Pulm: incentive spirometry while awake, PTA nasal sprays  FEN/GI: Regular diet, discontinue MIVF, colace for bowel regimen  Endo: No issues  : Trial of void this AM, IPP deflated at bedside, likely FLOR removal prior to discharge. PTA flomax, finasteride.   Heme/ID: No issues. Ancef while in-house, 1 week of bactrim ppx at discharge.   Activity: Ad ondina  PPx: SCDs  Dispo: Home today vs tomorrow pending pain control.    Seen and examined. Will discuss with Dr. Slater.    Stefania Sanderson MD  PGY-4 Urology  Pager [phone]     Contacting the Urology Team     Please use the following job codes to reach the Urology Team. Note that you must use an in house phone and that job codes cannot receive text pages.     On weekdays, dial [phone] (or star-star-star [phone] on the new [employer] telephones) then [phone] to reach the Adult Urology resident or PA on call    On weekdays, dial [phone] (or star-star-star 777 on the new [employer] telephones) then [phone] to reach the Pediatric Urology resident    On weeknights and weekends, dial [phone] (or star-star-star [phone] on the new [employer] telephones) then [phone] to reach the Urology resident on call " (for both Adult and Pediatrics)

## 2021-10-06 NOTE — PLAN OF CARE
A/O x 4. VSS. Tolerating reg diet, denies nausea. LS clear.  Jefe dc'd 0853. TOV with 180cc NS, pt voided immediately 180cc without difficulty. Pt has not voided a second time yet. Pt has ambulated in fields x 1. Pt has some difficulty getting OOB because of pain. C/o incisional pain, very tender to touch. FLOR draining small amount bloody drainage.Pain managed with po oxycodone, ice to area. Plan for possible discharge later today/ tomorrow pending pain management.     1430  Pt voided 325cc. Seen by urology this pm. Pt is staying today and will discharge in am.

## 2021-10-06 NOTE — PLAN OF CARE
"  VS:   /58 (BP Location: Left arm)   Pulse 75   Temp 97.1  F (36.2  C) (Oral)   Resp 18   Ht 1.626 m (5' 4\")   Wt 88.3 kg (194 lb 10.7 oz)   SpO2 96%   BMI 33.41 kg/m     Satting well on room air.   Output:   Voiding clear,yellow urine via vasquez.  FLOR about 4ml this shift.  Passing gas this morning.   Activity:   Sat on edge of bed only.  Independent with bed mobilities.   Skin: Intact    Pain:   Penile pain.Oxycodone 10mg given q 4 hrs.IV dilaudid 0.4mg given x2.     Neuro/CMS:   intact   Dressing(s):   Kerlix drsg  removed by urology resident this morning.No bleeding from penile are noted.   Diet:   Regular.   LDA:   PIV line L wrist area is patent,kept TKO for IV abx.   Equipment:   Refusing PCDs,states triggers pain on penile/groin/perineal area.   Plan:   Plan for urology to deflate device,TOV,pain controlled with oral meds prior to discharging home.   Additional Info:         "

## 2021-10-06 NOTE — PLAN OF CARE
"      VS:   /71   Pulse 89   Temp (!) 96.3  F (35.7  C) (Oral)   Resp 10   Ht 1.626 m (5' 4\")   Wt 88.3 kg (194 lb 10.7 oz)   SpO2 97%   BMI 33.41 kg/m       Output: Mayberry output: 375 ml  Bulb suction: 20 ml (bright red)   Lungs: Breath sounds clear and equal bilaterally   Activity: Not OOB during shift   Skin: WDL. Dressing on penis/scrotum.   UTV incision   Pain:   Pain from procedure. Rated as a 10/10. 0.4 dilaudid given IV around 2000. Oxycodone was given around 2221 for continued pain.   Neuro/CMS:   No numbness or tingling   Dressing(s):   Dressing on penis/scrotum   Diet:   Reg diet. Was hungry after procedure and ate food that was brought in for dinner   LDA:   FLOR drain  Mayberry catheter  Left PIV with continuous normal saline infusing   Equipment:   IV pole, capno   Plan:   Continue with care plan. Assess pain and manage with meds and/or ice packs.   Additional Info:   Came up from the PACU around 6pm after a penile prosthesis insertion. Pain is being managed with PRN IV dilaudid and oxycodone. Also has scheduled tylenol. Pt. Is able to make needs known and uses call light appropriately.        "

## 2021-10-07 VITALS
OXYGEN SATURATION: 94 % | RESPIRATION RATE: 16 BRPM | DIASTOLIC BLOOD PRESSURE: 55 MMHG | HEIGHT: 64 IN | SYSTOLIC BLOOD PRESSURE: 103 MMHG | WEIGHT: 194.67 LBS | TEMPERATURE: 97.7 F | BODY MASS INDEX: 33.23 KG/M2 | HEART RATE: 75 BPM

## 2021-10-07 LAB
ANION GAP SERPL CALCULATED.3IONS-SCNC: 3 MMOL/L (ref 3–14)
BUN SERPL-MCNC: 14 MG/DL (ref 7–30)
CALCIUM SERPL-MCNC: 8.1 MG/DL (ref 8.5–10.1)
CHLORIDE BLD-SCNC: 110 MMOL/L (ref 94–109)
CO2 SERPL-SCNC: 26 MMOL/L (ref 20–32)
CREAT SERPL-MCNC: 0.99 MG/DL (ref 0.66–1.25)
ERYTHROCYTE [DISTWIDTH] IN BLOOD BY AUTOMATED COUNT: 12.1 % (ref 10–15)
GFR SERPL CREATININE-BSD FRML MDRD: >90 ML/MIN/1.73M2
GLUCOSE BLD-MCNC: 114 MG/DL (ref 70–99)
HCT VFR BLD AUTO: 39.2 % (ref 40–53)
HGB BLD-MCNC: 12.9 G/DL (ref 13.3–17.7)
MCH RBC QN AUTO: 29.7 PG (ref 26.5–33)
MCHC RBC AUTO-ENTMCNC: 32.9 G/DL (ref 31.5–36.5)
MCV RBC AUTO: 90 FL (ref 78–100)
PLATELET # BLD AUTO: 207 10E3/UL (ref 150–450)
POTASSIUM BLD-SCNC: 3.9 MMOL/L (ref 3.4–5.3)
RBC # BLD AUTO: 4.35 10E6/UL (ref 4.4–5.9)
SODIUM SERPL-SCNC: 139 MMOL/L (ref 133–144)
WBC # BLD AUTO: 8 10E3/UL (ref 4–11)

## 2021-10-07 PROCEDURE — 250N000011 HC RX IP 250 OP 636: Performed by: STUDENT IN AN ORGANIZED HEALTH CARE EDUCATION/TRAINING PROGRAM

## 2021-10-07 PROCEDURE — 250N000013 HC RX MED GY IP 250 OP 250 PS 637: Performed by: STUDENT IN AN ORGANIZED HEALTH CARE EDUCATION/TRAINING PROGRAM

## 2021-10-07 PROCEDURE — 36415 COLL VENOUS BLD VENIPUNCTURE: CPT | Performed by: STUDENT IN AN ORGANIZED HEALTH CARE EDUCATION/TRAINING PROGRAM

## 2021-10-07 PROCEDURE — 80048 BASIC METABOLIC PNL TOTAL CA: CPT | Performed by: STUDENT IN AN ORGANIZED HEALTH CARE EDUCATION/TRAINING PROGRAM

## 2021-10-07 PROCEDURE — 85027 COMPLETE CBC AUTOMATED: CPT | Performed by: STUDENT IN AN ORGANIZED HEALTH CARE EDUCATION/TRAINING PROGRAM

## 2021-10-07 RX ORDER — SULFAMETHOXAZOLE/TRIMETHOPRIM 800-160 MG
1 TABLET ORAL 2 TIMES DAILY
Qty: 14 TABLET | Refills: 0 | Status: SHIPPED | OUTPATIENT
Start: 2021-10-07 | End: 2021-10-22

## 2021-10-07 RX ADMIN — ACETAMINOPHEN 650 MG: 325 TABLET, FILM COATED ORAL at 14:20

## 2021-10-07 RX ADMIN — FINASTERIDE 5 MG: 5 TABLET, FILM COATED ORAL at 08:11

## 2021-10-07 RX ADMIN — OXYCODONE HYDROCHLORIDE 10 MG: 10 TABLET ORAL at 04:06

## 2021-10-07 RX ADMIN — ATORVASTATIN CALCIUM 20 MG: 20 TABLET, FILM COATED ORAL at 08:11

## 2021-10-07 RX ADMIN — HYDROMORPHONE HYDROCHLORIDE 0.4 MG: 0.2 INJECTION, SOLUTION INTRAMUSCULAR; INTRAVENOUS; SUBCUTANEOUS at 15:02

## 2021-10-07 RX ADMIN — OXYCODONE HYDROCHLORIDE 5 MG: 5 TABLET ORAL at 11:55

## 2021-10-07 RX ADMIN — DOCUSATE SODIUM 100 MG: 100 CAPSULE, LIQUID FILLED ORAL at 08:10

## 2021-10-07 RX ADMIN — HYDROMORPHONE HYDROCHLORIDE 0.4 MG: 0.2 INJECTION, SOLUTION INTRAMUSCULAR; INTRAVENOUS; SUBCUTANEOUS at 00:29

## 2021-10-07 RX ADMIN — CEFAZOLIN 1 G: 1 INJECTION, POWDER, FOR SOLUTION INTRAMUSCULAR; INTRAVENOUS at 14:20

## 2021-10-07 RX ADMIN — CARVEDILOL 3.12 MG: 3.12 TABLET, FILM COATED ORAL at 08:10

## 2021-10-07 RX ADMIN — ACETAMINOPHEN 650 MG: 325 TABLET, FILM COATED ORAL at 08:11

## 2021-10-07 RX ADMIN — OXYCODONE HYDROCHLORIDE 10 MG: 10 TABLET ORAL at 08:11

## 2021-10-07 RX ADMIN — CEFAZOLIN 1 G: 1 INJECTION, POWDER, FOR SOLUTION INTRAMUSCULAR; INTRAVENOUS at 06:52

## 2021-10-07 RX ADMIN — TAMSULOSIN HYDROCHLORIDE 0.4 MG: 0.4 CAPSULE ORAL at 08:11

## 2021-10-07 RX ADMIN — OXYCODONE HYDROCHLORIDE 5 MG: 5 TABLET ORAL at 14:20

## 2021-10-07 NOTE — PLAN OF CARE
"      VS:   /43   Pulse 79   Temp (!) 96.7  F (35.9  C) (Oral)   Resp 16   Ht 1.626 m (5' 4\")   Wt 88.3 kg (194 lb 10.7 oz)   SpO2 96%   BMI 33.41 kg/m       Output: Urine output around 1820 (525 ml)  FLOR drain output: 25 ml (bright red/bloody)   Lungs: Breath sounds clear and equal bilaterally    Activity: OOB. Ambulated in hallway independently    Skin: Heels dry.  Incision on scrotum (under penis). Open to air   Pain:   Pt. Rate pain as 4/10 (more pain when moving). Oxycodone was given on request before ambulating in hallway. Was in a lot of pain after walking. Scheduled tylenol given and Oxycodone given before bed (around 2200).   Neuro/CMS:   A&O x4. No numbness, no tingling   Dressing(s):   none   Diet:   Regular diet   LDA:   FLOR drain to bulb suction (UTV)   Equipment:   IV pole   Plan:   Continue to monitor pain and urine output.    Additional Info:   Pt. Ambulated in fields during shift, tolerated okay. Pt. Stated he was in a lot of pain after he got back to his room.       "

## 2021-10-07 NOTE — PROGRESS NOTES
"      VS:   Blood pressure 103/55, pulse 75, temperature 97.7  F (36.5  C), temperature source Oral, resp. rate 16, height 1.626 m (5' 4\"), weight 88.3 kg (194 lb 10.7 oz), SpO2 94 %.     Output:   Voids spontaneously   Activity:   Up ad ondina   Skin: WDL   Pain:   Paion 4-9/10, worse with movement, had prn IV dilaudid after FLOR removal   Neuro/CMS:   WDL   Dressing(s):   No dressings   Diet:   Reg diet good appetite   LDA:   Left PIV   Equipment:      Plan:   Discharge home today   Additional Info:   Gave discharge instructions to pt and meds at change of shift, just needs IV removed prior to dc       "

## 2021-10-07 NOTE — DISCHARGE SUMMARY
Discharge Summary     Walter Rehman MRN# [id number]   YOB: 1975 Age: 45 year old     Date of Admission:  10/5/2021  Date of Discharge::  No discharge date for patient encounter.  Admitting Physician:  Jonah Slater MD  Discharge Physician:  Kailyn Castillo MD  Primary Care Physician:         Benji Vizcaino          Admission Diagnoses:   Erectile dysfunction due to arterial insufficiency [N52.01]          Discharge Diagnosis:   Same as above         Procedures:   Procedure(s):  INSERTION, PENILE PROSTHESIS, INFLATABLE        Non-operative procedures:   None performed          Consultations:   None         Imaging Studies:     Results for orders placed or performed in visit on 04/18/16   XR Chest 2 Views    Narrative    XR CHEST 2 VW 4/18/2016 1:34 PM    HISTORY: Cough and congestion.    COMPARISON: None.    FINDINGS: No airspace consolidation, pleural effusion or pneumothorax.  Normal heart size.      Impression    IMPRESSION: No acute cardiopulmonary abnormality.    JASPREET FINCH MD            Medications Prior to Admission:     Medications Prior to Admission   Medication Sig Dispense Refill Last Dose     atorvastatin (LIPITOR) 20 MG tablet Take 20 mg by mouth   10/4/2021 at Unknown time     carvedilol (COREG) 3.125 MG tablet Take 3.125 mg by mouth 2 times daily (with meals)   10/4/2021 at Unknown time     finasteride (PROSCAR) 5 MG tablet Take 5 mg by mouth   10/4/2021 at Unknown time     fluticasone (FLONASE) 50 MCG/ACT nasal spray Spray 1 spray in nostril   Past Week at Unknown time     guaiFENesin-codeine (ROBITUSSIN AC) 100-10 MG/5ML SOLN Take 5 mLs by mouth every 6 hours as needed for cough 60 mL 0 More than a month at Unknown time     montelukast (SINGULAIR) 10 MG tablet Take 10 mg by mouth   10/4/2021 at Unknown time     tamsulosin (FLOMAX) 0.4 MG capsule Take 0.4 mg by mouth   10/4/2021 at Unknown time            Discharge Medications:      Current Discharge Medication List      START taking these medications    Details   oxyCODONE (ROXICODONE) 5 MG tablet Take 1 tablet (5 mg) by mouth every 6 hours as needed for pain  Qty: 20 tablet, Refills: 0    Associated Diagnoses: Erectile dysfunction due to arterial insufficiency      sulfamethoxazole-trimethoprim (BACTRIM DS) 800-160 MG tablet Take 1 tablet by mouth 2 times daily  Qty: 14 tablet, Refills: 0    Associated Diagnoses: Erectile dysfunction due to arterial insufficiency         CONTINUE these medications which have NOT CHANGED    Details   atorvastatin (LIPITOR) 20 MG tablet Take 20 mg by mouth      carvedilol (COREG) 3.125 MG tablet Take 3.125 mg by mouth 2 times daily (with meals)      finasteride (PROSCAR) 5 MG tablet Take 5 mg by mouth      fluticasone (FLONASE) 50 MCG/ACT nasal spray Spray 1 spray in nostril      guaiFENesin-codeine (ROBITUSSIN AC) 100-10 MG/5ML SOLN Take 5 mLs by mouth every 6 hours as needed for cough  Qty: 60 mL, Refills: 0    Associated Diagnoses: Chest congestion; Cough      montelukast (SINGULAIR) 10 MG tablet Take 10 mg by mouth      tamsulosin (FLOMAX) 0.4 MG capsule Take 0.4 mg by mouth                 Brief History of Illness:   Reason for admission requiring a surgical or invasive procedure:   Erectile dysfunction due to arterial insufficiency [N52.01]   The patient underwent the following procedure(s):   See above   There were no immediate complications during this procedure.    Please refer to the full operative summary for details.           Hospital Course:   The patient's hospital course was unremarkable.  Walter J Ori recovered as anticipated and experienced no post-operative complications.     On POD#2 patient was ambulating without assitance, tolerating the discharge diet, had pain controlled with PO medications to go home with, and requiring no IV medications or fluids. Patient was discharged home with appropriate contact information, follow-up  and instructions as seen below in the discharge paperwork.         Final Pathology Result:   Pending at time of discharge         Discharge Instructions and Follow-Up:     Discharge Procedure Orders   Discharge Instructions - Comfort and Pain Management   Order Comments: Activity  - No strenuous exercise for 4 weeks.  - No lifting, pushing, pulling more than 10 pounds for 4 weeks.   - Do not strain with bowel movements.  - Do not drive until you can press the brake pedal quickly and fully without pain.   - Do not operate a motor vehicle while taking narcotic pain medications.     Incisions  - You may shower and get incisions wet starting 48 hrs after surgery.  - Do not scrub incisions or submerge wounds for 2 weeks or until seen in follow-up.   - No dressing is needed. You may wear tight-fitting underwear for comfort  - No sexual activity until seen in clinic  - Daily, pull the pump down into the dependent portion of the scrotum to prevent this from riding up  - Leave incision open to air. Cover with gauze only if needed for comfort or to protect clothing from drainage.   - The stitches do not need to be removed, they will dissolve on their own.    Medications  - Complete a 1 week course of bactrim (antibiotic) to prevent infection  - Transition from narcotic pain medications to tylenol (acetaminophen) as you are able.  Wean yourself off all pain medications as you are able.  - Some pain medications contain both tylenol (acetaminophen) and a narcotic (Norco, vicodin, percocet), do not take more than 4,000mg of Tylenol (acetaminophen) from all sources in any 24 hour period.  - Narcotics can make you constipated.  Take over the counter fiber (metamucil or benefiber) and stool softeners (miralax, docusate or senna) while taking narcotic pain medications, but stop if you develop diarrhea.  - No driving or operating machinery while taking narcotic pain medications     Follow-Up:  - Follow-up with your surgeon as  "scheduled on 10/22  - Call or return sooner than your regularly scheduled visit if you develop any of the following: fever (greater than 101.5), uncontrolled pain, uncontrolled nausea or vomiting, as well as increased redness, swelling, or drainage from your wound.     Phone numbers:   - Monday through Friday 8am to 4:30pm: Call [phone] with questions or to schedule or confirm appointment.    - Nights or weekends: call the after hours emergency pager - [phone] and tell the  \"I would like to page the Urology Resident on call.\"  - For emergencies, call 911            Discharge Disposition:     Discharged to Home      Condition at discharge: Good    --    Kailyn Castillo MD  Urology Resident    4:39 PM, 10/7/2021    "

## 2021-10-07 NOTE — PROGRESS NOTES
"Urology  Progress Note  10/07/2021    - Still having penile pain, this has been slowly improving  - Catheter removed yesterday, voiding without issue  - Able to walk around the hospital yesterday    Exam  /44 (BP Location: Left arm)   Pulse 79   Temp 97.7  F (36.5  C) (Oral)   Resp 16   Ht 1.626 m (5' 4\")   Wt 88.3 kg (194 lb 10.7 oz)   SpO2 94%   BMI 33.41 kg/m    No acute distress  Unlabored breathing  Abdomen soft, nontender, nondistended.  Penis tender to palpation, glans appears well perfused. Scrotal incision c/d/i. Scrotum tender without hematoma.   FLOR sanguinous    I/O's (last 24/since midnight):  FLOR 29/10    Labs 10/7 (10/6)  WBC 8.0 (11.6)  Hb 12.9 (13.4)  Cr 0.99 (1.03)    Assessment/Plan  45 year old y/o male POD#2 s/p IPP placement. Having significant post-op pain but feels ready for discharge home today.     Neuro: Tylenol, oxy, dilaudid for pain control  CV: HDS, PTA statin, coreg  Pulm: incentive spirometry while awake, PTA nasal sprays  FEN/GI: Regular diet, no MIVF, colace for bowel regimen  Endo: No issues  : FLOR removal this AM. PTA flomax, finasteride.   Heme/ID: No issues. Ancef while in-house, 1 week of bactrim ppx at discharge.   Activity: Ad ondina  PPx: SCDs  Dispo: Home today    Seen and examined. Will discuss with Dr. Slater.    Stefania Sanderson MD  PGY-4 Urology  Pager [phone]     Contacting the Urology Team     Please use the following job codes to reach the Urology Team. Note that you must use an in house phone and that job codes cannot receive text pages.     On weekdays, dial [phone] (or star-star-star [phone] on the new [employer] telephones) then [phone] to reach the Adult Urology resident or PA on call    On weekdays, dial [phone] (or star-star-star [phone] on the new [employer] telephones) then [phone] to reach the Pediatric Urology resident    On weeknights and weekends, dial [phone] (or star-star-star [phone] on the new [employer] telephones) then [phone] to reach the Urology resident on call (for both Adult and " Pediatrics)

## 2021-10-07 NOTE — PROGRESS NOTES
A/Ox's 4. NO INFO TO  FAMILY PER PATIENT.  Pt rated pain as tolerable. Oxycodone, Iv dialudid and Ice packs given for pain control. Pt has no dressing but has a drain. FLOR had 10 ml overnight. CMS intact. Tolerated regular diet. Denied any nausea, CP, SOB, lightheadedness or dizziness. Voiding without pain or difficulty. Passing flatus. Pt up ind. Resting in bed at this time with call light in reach. Able to make needs known. Continue to monitor.

## 2021-10-11 ENCOUNTER — TELEPHONE (OUTPATIENT)
Dept: NURSING | Facility: CLINIC | Age: 46
End: 2021-10-11

## 2021-10-11 NOTE — TELEPHONE ENCOUNTER
.  [facility]: Nurse Triage Note  SITUATION/BACKGROUND                                                      Walter Rehman is a 45 year old male s/p INSERTION, PENILE PROSTHESIS, INFLATABLE procedure on 10/5/21. Patient calling to report having intermittent penile pain 7/10. He is taking antibiotic as directed. No pain relief with ibuprofen/ tylenol.   Warm transfer given to Urology DEBORAH Mckenzie for further assistance.   Charlee Marie, RN, RN

## 2021-10-15 ENCOUNTER — NURSE TRIAGE (OUTPATIENT)
Dept: NURSING | Facility: CLINIC | Age: 46
End: 2021-10-15

## 2021-10-15 DIAGNOSIS — G89.18 POSTOPERATIVE PAIN: Primary | ICD-10-CM

## 2021-10-15 RX ORDER — TRAMADOL HYDROCHLORIDE 50 MG/1
50 TABLET ORAL EVERY 6 HOURS PRN
Qty: 15 TABLET | Refills: 0 | Status: SHIPPED | OUTPATIENT
Start: 2021-10-15 | End: 2021-10-19

## 2021-10-15 NOTE — TELEPHONE ENCOUNTER
45 year old male s/p Placement of Coloplast 3-piece inflatable penile prosthesis on 10/5/21   He states he had come in to the clinic was instructed on how to deflate his pump  He tried to deflate at home and has questions   Not sure if he did it correctly      Will send message to urology team to call back with instructions

## 2021-10-22 ENCOUNTER — OFFICE VISIT (OUTPATIENT)
Dept: UROLOGY | Facility: CLINIC | Age: 46
End: 2021-10-22
Payer: COMMERCIAL

## 2021-10-22 VITALS
BODY MASS INDEX: 33.12 KG/M2 | WEIGHT: 194 LBS | HEART RATE: 78 BPM | DIASTOLIC BLOOD PRESSURE: 82 MMHG | HEIGHT: 64 IN | SYSTOLIC BLOOD PRESSURE: 121 MMHG

## 2021-10-22 DIAGNOSIS — Z09 POSTOP CHECK: ICD-10-CM

## 2021-10-22 DIAGNOSIS — N52.9 ERECTILE DYSFUNCTION, UNSPECIFIED ERECTILE DYSFUNCTION TYPE: Primary | ICD-10-CM

## 2021-10-22 PROCEDURE — 99024 POSTOP FOLLOW-UP VISIT: CPT | Performed by: UROLOGY

## 2021-10-22 ASSESSMENT — MIFFLIN-ST. JEOR: SCORE: 1680.98

## 2021-10-22 ASSESSMENT — PAIN SCALES - GENERAL: PAINLEVEL: MODERATE PAIN (4)

## 2021-10-22 NOTE — PROGRESS NOTES
"CC: Walter Rehman  is post-op from IPP, Titan, done on 10/5/21.  HPI: Patient is  2 wks post-op.  He has been doing well. No fevers or chills. Pain decreasing but still bothersome.    Exam: /82 (BP Location: Right arm, Patient Position: Sitting, Cuff Size: Adult Regular)   Pulse 78   Ht 1.626 m (5' 4\")   Wt 88 kg (194 lb)   BMI 33.30 kg/m   . NAD.   Incision healing well. No discharge, erythema or fluctuence suggestive of infection.   Penis is fairly inflated, pump is easily palpable in the anterior scrotum.  He did not tolerate deflating it much.  Instructed on deflation and model pump given, he will deflate at home.  Model pump given.      PLAN:     Instructed him not to activate the device, to continue to feel for the pump. Keep deflated.   Return to clinic 4 weeks for activation.        "

## 2021-10-22 NOTE — LETTER
"10/22/2021       RE: Walter Rehman  [address]  [place] [facility] [address]     Dear Colleague,    Thank you for referring your patient, Walter Rehman, to the [facility] [place] at [facility]. Please see a copy of my visit note below.    CC: Walter Rehman  is post-op from IPP, Titan, done on 10/5/21.  HPI: Patient is  2 wks post-op.  He has been doing well. No fevers or chills. Pain decreasing but still bothersome.    Exam: /82 (BP Location: Right arm, Patient Position: Sitting, Cuff Size: Adult Regular)   Pulse 78   Ht 1.626 m (5' 4\")   Wt 88 kg (194 lb)   BMI 33.30 kg/m   . NAD.   Incision healing well. No discharge, erythema or fluctuence suggestive of infection.   Penis is fairly inflated, pump is easily palpable in the anterior scrotum.  He did not tolerate deflating it much.  Instructed on deflation and model pump given, he will deflate at home.  Model pump given.      PLAN:     Instructed him not to activate the device, to continue to feel for the pump. Keep deflated.   Return to clinic 4 weeks for activation.      Again, thank you for allowing me to participate in the care of your patient.      Sincerely,    Jonah Slater MD      "

## 2021-10-22 NOTE — LETTER
[facility] [address]  [address]LO[address]  [place] [address]  [phone]          October 22, 2021    RE:  Walter Rehman                                                                                                                                                       [address]  [place] [address]            To whom it may concern:    Walter Rehman is under my professional care in urology clinic.  He underwent a surgical procedure on 10/5/21.    He had lifting and activity restrictions for the first few weeks after surgery, but he was evaluated today in clinic and he may now return to work at this time without particular restrictions.      Sincerely,        Jonah Slater MD

## 2021-11-02 ENCOUNTER — TELEPHONE (OUTPATIENT)
Dept: UROLOGY | Facility: CLINIC | Age: 46
End: 2021-11-02

## 2021-11-02 NOTE — TELEPHONE ENCOUNTER
[facility] Call Center    Phone Message    May a detailed message be left on voicemail: yes     Reason for Call: Patient is calling in to schedule appointment for 11/12 @11:00 a.m.  Patient states that time and date works for him.  Writer not able to find appointment.  Please schedule appointment for patient.    Action Taken: Message routed to:  [facility] ([facility]): Urology    Travel Screening: Not Applicable

## 2021-11-02 NOTE — TELEPHONE ENCOUNTER
----- Message from Heron Bhatti sent at 10/29/2021  1:29 PM CDT -----  Hi all,    Can someone please schedule this patient with Dr. Slater for a post op follow up on November 12th at 11am?    Thanks,    Adonay HOLBROOK  ----- Message -----  From: Rylee Cleary, EMT  Sent: 10/29/2021  11:49 AM CDT  To: Randi Cerna LPN, #    Hi,    Looks like another IPP activation appointment? Not sure what this entails as far as scheduling goes.  Can you help coordinators out with this one as well Adonay?    Daniella,   Rylee   ----- Message -----  From: Pepper Frias  Sent: 10/25/2021   2:14 PM CDT  To: Rylee Cerna, EMT, #    Return in about 4 weeks (around 11/19/2021). Return to clinic 4 weeks IPP post-op exam.    Nothing available.

## 2021-11-03 ENCOUNTER — PRE VISIT (OUTPATIENT)
Dept: UROLOGY | Facility: CLINIC | Age: 46
End: 2021-11-03

## 2021-11-03 NOTE — TELEPHONE ENCOUNTER
Reason for visit: Follow up     Relevant information: s/p IPP insertion; device activation    Records/imaging/labs/orders: in EPIC    Pt called: no    At Rooming: normal

## 2021-11-12 ENCOUNTER — OFFICE VISIT (OUTPATIENT)
Dept: UROLOGY | Facility: CLINIC | Age: 46
End: 2021-11-12
Payer: COMMERCIAL

## 2021-11-12 VITALS
HEIGHT: 64 IN | BODY MASS INDEX: 33.29 KG/M2 | DIASTOLIC BLOOD PRESSURE: 77 MMHG | HEART RATE: 80 BPM | SYSTOLIC BLOOD PRESSURE: 125 MMHG | WEIGHT: 195 LBS

## 2021-11-12 DIAGNOSIS — F52.4 PREMATURE EJACULATION: Primary | ICD-10-CM

## 2021-11-12 PROCEDURE — 99024 POSTOP FOLLOW-UP VISIT: CPT | Performed by: UROLOGY

## 2021-11-12 ASSESSMENT — PAIN SCALES - GENERAL: PAINLEVEL: NO PAIN (0)

## 2021-11-12 ASSESSMENT — MIFFLIN-ST. JEOR: SCORE: 1680.51

## 2021-11-12 NOTE — NURSING NOTE
"Chief Complaint   Patient presents with     Follow Up     IPP activation       Height 1.626 m (5' 4\"), weight 88.5 kg (195 lb). Body mass index is 33.47 kg/m .    Patient Active Problem List   Diagnosis     Erectile dysfunction due to arterial insufficiency       Allergies   Allergen Reactions     Ciprofloxacin Other (See Comments)     Severe vertigo; question after the fact if this is accurate as vertigo is a long standing problem that comes and goes and likely coincidential  Severe vertigo; question after the fact if this is accurate as vertigo is a long standing problem that comes and goes and likely coincidential         Current Outpatient Medications   Medication Sig Dispense Refill     atorvastatin (LIPITOR) 20 MG tablet Take 20 mg by mouth       carvedilol (COREG) 3.125 MG tablet Take 3.125 mg by mouth 2 times daily (with meals)       finasteride (PROSCAR) 5 MG tablet Take 5 mg by mouth       fluticasone (FLONASE) 50 MCG/ACT nasal spray Spray 1 spray in nostril       guaiFENesin-codeine (ROBITUSSIN AC) 100-10 MG/5ML SOLN Take 5 mLs by mouth every 6 hours as needed for cough 60 mL 0     montelukast (SINGULAIR) 10 MG tablet Take 10 mg by mouth       tamsulosin (FLOMAX) 0.4 MG capsule Take 0.4 mg by mouth         Social History     Tobacco Use     Smoking status: Former Smoker     Smokeless tobacco: Never Used   Substance Use Topics     Alcohol use: Yes     Alcohol/week: 0.0 standard drinks     Drug use: None       Heron Bhatti EMT  11/12/2021  11:31 AM  "

## 2021-11-12 NOTE — PATIENT INSTRUCTIONS
Please follow up with Dr. Slater as needed.    It was a pleasure meeting with you today.  Thank you for allowing me and my team the privilege of caring for you today.  YOU are the reason we are here, and I truly hope we provided you with the excellent service you deserve.  Please let us know if there is anything else we can do for you so that we can be sure you are leaving completely satisfied with your care experience.

## 2021-11-12 NOTE — PROGRESS NOTES
"CC: Walter Rehman  is post-op from IPP, Titan, done on 10/5/21.  HPI: Patient is  5 wks post-op.  He has been doing well. No fevers or chills. Pain decreasing but still bothersome.    Exam  /77   Pulse 80   Ht 1.626 m (5' 4\")   Wt 88.5 kg (195 lb)   BMI 33.47 kg/m     Incision healing well. No discharge, erythema or fluctuence suggestive of infection.   Penis is fairly inflated, pump is easily palpable in the posterior scrotum.  He tolerates operating the device much better, but he is still sore.    PLAN:     Instructed him to activate the device up and down a few times a day. Keep deflated as much as possible.  I can see him back as needed as he is healing well, and demonstrates normal device operation.  PRN follow-up with urology.    Kierra SEGAL      Visit within post-op global.              "

## 2021-11-12 NOTE — LETTER
"11/12/2021       RE: Walter Rehman  [address]  [place] [facility] [address]     Dear Colleague,    Thank you for referring your patient, Walter Rehman, to the [facility] [place] at [facility]. Please see a copy of my visit note below.    CC: Walter Rehman  is post-op from IPP, Titan, done on 10/5/21.  HPI: Patient is  5 wks post-op.  He has been doing well. No fevers or chills. Pain decreasing but still bothersome.    Exam  /77   Pulse 80   Ht 1.626 m (5' 4\")   Wt 88.5 kg (195 lb)   BMI 33.47 kg/m     Incision healing well. No discharge, erythema or fluctuence suggestive of infection.   Penis is fairly inflated, pump is easily palpable in the posterior scrotum.  He tolerates operating the device much better, but he is still sore.    PLAN:     Instructed him to activate the device up and down a few times a day. Keep deflated as much as possible.  I can see him back as needed as he is healing well, and demonstrates normal device operation.  PRN follow-up with urology.    Kierra ESGAL      Visit within post-op global.       "

## 2023-09-01 ENCOUNTER — OFFICE VISIT (OUTPATIENT)
Dept: URGENT CARE | Facility: URGENT CARE | Age: 48
End: 2023-09-01
Payer: COMMERCIAL

## 2023-09-01 VITALS
OXYGEN SATURATION: 98 % | WEIGHT: 191 LBS | DIASTOLIC BLOOD PRESSURE: 79 MMHG | RESPIRATION RATE: 18 BRPM | BODY MASS INDEX: 32.79 KG/M2 | HEART RATE: 91 BPM | TEMPERATURE: 98.2 F | SYSTOLIC BLOOD PRESSURE: 124 MMHG

## 2023-09-01 DIAGNOSIS — J30.2 SEASONAL ALLERGIC RHINITIS, UNSPECIFIED TRIGGER: Primary | ICD-10-CM

## 2023-09-01 DIAGNOSIS — H10.13 ALLERGIC CONJUNCTIVITIS, BILATERAL: ICD-10-CM

## 2023-09-01 PROCEDURE — 99203 OFFICE O/P NEW LOW 30 MIN: CPT | Performed by: PHYSICIAN ASSISTANT

## 2023-09-01 RX ORDER — PRAVASTATIN SODIUM 10 MG
TABLET ORAL
COMMUNITY
Start: 2023-07-10

## 2023-09-01 RX ORDER — SEMAGLUTIDE 0.25 MG/.5ML
INJECTION, SOLUTION SUBCUTANEOUS
COMMUNITY
Start: 2023-08-28

## 2023-09-01 RX ORDER — PREDNISONE 10 MG/1
TABLET ORAL
Qty: 30 TABLET | Refills: 0 | Status: SHIPPED | OUTPATIENT
Start: 2023-09-01

## 2023-09-01 RX ORDER — OLOPATADINE HYDROCHLORIDE 1 MG/ML
1 SOLUTION/ DROPS OPHTHALMIC 2 TIMES DAILY
Qty: 5 ML | Refills: 0 | Status: SHIPPED | OUTPATIENT
Start: 2023-09-01

## 2023-09-01 RX ORDER — LOSARTAN POTASSIUM 25 MG/1
TABLET ORAL
COMMUNITY
Start: 2023-05-15

## 2023-09-01 NOTE — PROGRESS NOTES
Chief Complaint   Patient presents with    Urgent Care    Allergies     Per patient states his allergies are really bad and is requesting a script of prednisone,                ASSESSMENT:     ICD-10-CM    1. Seasonal allergic rhinitis, unspecified trigger  J30.2 predniSONE (DELTASONE) 10 MG tablet     olopatadine (PATANOL) 0.1 % ophthalmic solution      2. Allergic conjunctivitis, bilateral  H10.13 predniSONE (DELTASONE) 10 MG tablet     olopatadine (PATANOL) 0.1 % ophthalmic solution            PLAN: Uncontrolled allergies.  Prednisone taper Patanol eyedrops.  Keep allergy appointment for October.  Continue Zyrtec, continue Singulair, continue Benadryl as needed.  I have discussed clinical findings with patient.  Side effects of medications discussed.  Symptomatic care is discussed.  I have discussed the possibility of  worsening symptoms and indication to RTC or go to the ER if they occur.  All questions are answered, patient indicates understanding of these issues and is in agreement with plan.   Patient care instructions are discussed/given at the end of visit.       Adore Tee PA-C      SUBJECTIVE:  47-year-old male with history of seasonal allergies presents for medication to help with relief.  Has allergy appointment October 2.  Using Zyrtec, Benadryl as needed and Singulair without significant relief.  Had prednisone 2 months ago which significantly helped but once off symptoms returned.  Also with watery eyes.  Allergy symptoms seem to get worse without the [place] fires.  Beginning to get little hive-like rashes on his wrists and below his eyes.      Allergies   Allergen Reactions    Ciprofloxacin Other (See Comments)     Severe vertigo; question after the fact if this is accurate as vertigo is a long standing problem that comes and goes and likely coincidential  Severe vertigo; question after the fact if this is accurate as vertigo is a long standing problem that comes and goes and likely  coincidential         Past Medical History:   Diagnosis Date    Depressive disorder     Erectile dysfunction     Uncomplicated asthma        atorvastatin (LIPITOR) 20 MG tablet, Take 20 mg by mouth  carvedilol (COREG) 3.125 MG tablet, Take 3.125 mg by mouth 2 times daily (with meals)  finasteride (PROSCAR) 5 MG tablet, Take 5 mg by mouth  fluticasone (FLONASE) 50 MCG/ACT nasal spray, Spray 1 spray in nostril  losartan (COZAAR) 25 MG tablet,   montelukast (SINGULAIR) 10 MG tablet, Take 10 mg by mouth  pravastatin (PRAVACHOL) 10 MG tablet,   tamsulosin (FLOMAX) 0.4 MG capsule, Take 0.4 mg by mouth  WEGOVY 0.25 MG/0.5ML pen,   guaiFENesin-codeine (ROBITUSSIN AC) 100-10 MG/5ML SOLN, Take 5 mLs by mouth every 6 hours as needed for cough (Patient not taking: Reported on 9/1/2023)    No current facility-administered medications on file prior to visit.      Social History     Tobacco Use    Smoking status: Former    Smokeless tobacco: Never   Substance Use Topics    Alcohol use: Yes     Alcohol/week: 0.0 standard drinks of alcohol       ROS:  CONSTITUTIONAL: Negative for fatigue or fever.  EYES: As above .  ENT: As above.  RESP: Negative for shortness of breath   CV: Negative for chest pains.  GI: Negative for vomiting.  MUSCULOSKELETAL:  Negative for significant muscle or joint pains.  NEUROLOGIC: Negative for headaches.  SKIN: As above   PSYCH: Normal mentation for age.    OBJECTIVE:  /79   Pulse 91   Temp 98.2  F (36.8  C) (Tympanic)   Resp 18   Wt 86.6 kg (191 lb)   SpO2 98%   BMI 32.79 kg/m    GENERAL APPEARANCE: Healthy, alert and no distress.  EYES:Conjunctiva/sclera clear.  RESP: Breathing comfortably   CV: Regular rate and rhythm, normal S1 S2, no murmur noted.  NEURO: Awake, alert    SKIN: Left dorsal wrist with a few elevated macules.  Inferior to left eye with a few pink elevated macules.    No tongue, lip, throat swelling        Adore Tee PA-C

## (undated) DEVICE — PAD CHUX UNDERPAD 30X36" P3036C

## (undated) DEVICE — SU VICRYL 2-0 SH 27" UND J417H

## (undated) DEVICE — DRAPE LAP TRANSVERSE 29421

## (undated) DEVICE — RETR PENILE WILSON XL 12"  TLC-5042-M

## (undated) DEVICE — TUBING SUCTION MEDI-VAC SOFT 3/16"X20' N520A

## (undated) DEVICE — CATH PLUG W/CAP 000076

## (undated) DEVICE — TUBING SUCTION MEDI-VAC 1/4"X20' N620A

## (undated) DEVICE — DRAIN JACKSON PRATT RESERVOIR 100ML SU130-1305

## (undated) DEVICE — DRSG KERLIX FLUFFS X5

## (undated) DEVICE — LINEN GOWN X4 5410

## (undated) DEVICE — SU MONOCRYL 4-0 PS-2 18" UND Y496G

## (undated) DEVICE — Device

## (undated) DEVICE — BLADE KNIFE SURG 15 371115

## (undated) DEVICE — DRAPE IOBAN C-SECTION W/POUCH 30X35" 6657

## (undated) DEVICE — DRSG BIOPATCH GERMICIDAL SPLIT SPONGE 4MM MED 4150

## (undated) DEVICE — SYR 50ML LL W/O NDL 309653

## (undated) DEVICE — CATH TRAY FOLEY SURESTEP 16FR WDRAIN BAG STLK LATEX A300316A

## (undated) DEVICE — SOL NACL 0.9% IRRIG 1000ML BOTTLE 2F7124

## (undated) DEVICE — GLOVE PROTEXIS BLUE W/NEU-THERA 8.0  2D73EB80

## (undated) DEVICE — SUCTION TIP YANKAUER W/O VENT K86

## (undated) DEVICE — LINEN ORTHO PACK 5446

## (undated) DEVICE — SUPPORTER ATHLETIC LG LATEX 202636

## (undated) DEVICE — LABEL MEDICATION SYSTEM 3303-P

## (undated) DEVICE — SU PDS II 2-0 CT-2 27"  Z333H

## (undated) DEVICE — WIPES FOLEY CARE SURESTEP PROVON DFC100

## (undated) DEVICE — DRAPE IOBAN INCISE 13X13" 6640EZ

## (undated) DEVICE — DRAPE MAYO STAND 23X54 8337

## (undated) DEVICE — SYR 10ML FINGER CONTROL W/O NDL 309695

## (undated) DEVICE — LINEN TOWEL PACK X5 5464

## (undated) DEVICE — GLOVE PROTEXIS W/NEU-THERA 7.5  2D73TE75

## (undated) DEVICE — PREP CHLORAPREP 26ML TINTED HI-LITE ORANGE 930815

## (undated) DEVICE — ESU GROUND PAD UNIVERSAL W/O CORD

## (undated) DEVICE — POSITIONER ARMBOARD FOAM 1PAIR LF FP-ARMB1

## (undated) DEVICE — DRAIN JACKSON PRATT 10FR ROUND SU130-1321

## (undated) DEVICE — SU ETHILON 2-0 PS 18" 585H

## (undated) DEVICE — SYR BULB IRRIG 50ML LATEX FREE 0035280

## (undated) DEVICE — SYR 20ML LL W/O NDL 302830

## (undated) DEVICE — DRSG KERLIX 4 1/2"X4YDS ROLL 6730

## (undated) DEVICE — SOL WATER IRRIG 1000ML BOTTLE 2F7114

## (undated) DEVICE — SUCTION MANIFOLD NEPTUNE 2 SYS 4 PORT 0702-020-000

## (undated) RX ORDER — FENTANYL CITRATE-0.9 % NACL/PF 10 MCG/ML
PLASTIC BAG, INJECTION (ML) INTRAVENOUS
Status: DISPENSED
Start: 2021-10-05

## (undated) RX ORDER — HYDROMORPHONE HCL IN WATER/PF 6 MG/30 ML
PATIENT CONTROLLED ANALGESIA SYRINGE INTRAVENOUS
Status: DISPENSED
Start: 2021-10-05

## (undated) RX ORDER — HYDROMORPHONE HYDROCHLORIDE 1 MG/ML
INJECTION, SOLUTION INTRAMUSCULAR; INTRAVENOUS; SUBCUTANEOUS
Status: DISPENSED
Start: 2021-10-05

## (undated) RX ORDER — FENTANYL CITRATE 50 UG/ML
INJECTION, SOLUTION INTRAMUSCULAR; INTRAVENOUS
Status: DISPENSED
Start: 2021-10-05

## (undated) RX ORDER — PROPOFOL 10 MG/ML
INJECTION, EMULSION INTRAVENOUS
Status: DISPENSED
Start: 2021-10-05

## (undated) RX ORDER — LIDOCAINE HYDROCHLORIDE 20 MG/ML
INJECTION, SOLUTION EPIDURAL; INFILTRATION; INTRACAUDAL; PERINEURAL
Status: DISPENSED
Start: 2021-10-05

## (undated) RX ORDER — ROCURONIUM BROMIDE 50 MG/5 ML
SYRINGE (ML) INTRAVENOUS
Status: DISPENSED
Start: 2021-10-05

## (undated) RX ORDER — BUPIVACAINE HYDROCHLORIDE 5 MG/ML
INJECTION, SOLUTION EPIDURAL; INTRACAUDAL
Status: DISPENSED
Start: 2021-10-05

## (undated) RX ORDER — ACETAMINOPHEN 325 MG/1
TABLET ORAL
Status: DISPENSED
Start: 2021-10-05

## (undated) RX ORDER — EPHEDRINE SULFATE 50 MG/ML
INJECTION, SOLUTION INTRAMUSCULAR; INTRAVENOUS; SUBCUTANEOUS
Status: DISPENSED
Start: 2021-10-05

## (undated) RX ORDER — CEFAZOLIN SODIUM 2 G/100ML
INJECTION, SOLUTION INTRAVENOUS
Status: DISPENSED
Start: 2021-10-05

## (undated) RX ORDER — OXYCODONE HYDROCHLORIDE 5 MG/1
TABLET ORAL
Status: DISPENSED
Start: 2021-10-05

## (undated) RX ORDER — DEXAMETHASONE SODIUM PHOSPHATE 4 MG/ML
INJECTION, SOLUTION INTRA-ARTICULAR; INTRALESIONAL; INTRAMUSCULAR; INTRAVENOUS; SOFT TISSUE
Status: DISPENSED
Start: 2021-10-05